# Patient Record
Sex: FEMALE | Race: BLACK OR AFRICAN AMERICAN | Employment: FULL TIME | URBAN - METROPOLITAN AREA
[De-identification: names, ages, dates, MRNs, and addresses within clinical notes are randomized per-mention and may not be internally consistent; named-entity substitution may affect disease eponyms.]

---

## 2017-04-07 ENCOUNTER — APPOINTMENT (OUTPATIENT)
Dept: GENERAL RADIOLOGY | Age: 37
End: 2017-04-07
Attending: EMERGENCY MEDICINE
Payer: COMMERCIAL

## 2017-04-07 ENCOUNTER — HOSPITAL ENCOUNTER (EMERGENCY)
Age: 37
Discharge: HOME | End: 2017-04-08
Attending: EMERGENCY MEDICINE
Payer: COMMERCIAL

## 2017-04-07 DIAGNOSIS — M25.552 PAIN OF LEFT HIP JOINT: Primary | ICD-10-CM

## 2017-04-07 LAB — HCG UR QL: NEGATIVE

## 2017-04-07 PROCEDURE — 73502 X-RAY EXAM HIP UNI 2-3 VIEWS: CPT

## 2017-04-07 PROCEDURE — 96372 THER/PROPH/DIAG INJ SC/IM: CPT

## 2017-04-07 PROCEDURE — 99284 EMERGENCY DEPT VISIT MOD MDM: CPT

## 2017-04-07 PROCEDURE — 81025 URINE PREGNANCY TEST: CPT

## 2017-04-07 RX ORDER — CYCLOBENZAPRINE HCL 10 MG
10 TABLET ORAL
Status: COMPLETED | OUTPATIENT
Start: 2017-04-07 | End: 2017-04-08

## 2017-04-07 RX ORDER — MORPHINE SULFATE 4 MG/ML
6 INJECTION, SOLUTION INTRAMUSCULAR; INTRAVENOUS
Status: COMPLETED | OUTPATIENT
Start: 2017-04-07 | End: 2017-04-08

## 2017-04-07 RX ORDER — ONDANSETRON 4 MG/1
8 TABLET, ORALLY DISINTEGRATING ORAL
Status: COMPLETED | OUTPATIENT
Start: 2017-04-07 | End: 2017-04-08

## 2017-04-08 VITALS
WEIGHT: 245 LBS | TEMPERATURE: 98.3 F | SYSTOLIC BLOOD PRESSURE: 139 MMHG | RESPIRATION RATE: 16 BRPM | OXYGEN SATURATION: 99 % | HEART RATE: 90 BPM | DIASTOLIC BLOOD PRESSURE: 83 MMHG | HEIGHT: 65 IN | BODY MASS INDEX: 40.82 KG/M2

## 2017-04-08 PROCEDURE — 74011250637 HC RX REV CODE- 250/637: Performed by: EMERGENCY MEDICINE

## 2017-04-08 PROCEDURE — 74011250636 HC RX REV CODE- 250/636: Performed by: EMERGENCY MEDICINE

## 2017-04-08 RX ORDER — NAPROXEN 500 MG/1
500 TABLET ORAL 2 TIMES DAILY WITH MEALS
Qty: 20 TAB | Refills: 0 | Status: SHIPPED | OUTPATIENT
Start: 2017-04-08 | End: 2017-04-18

## 2017-04-08 RX ORDER — CYCLOBENZAPRINE HCL 5 MG
5 TABLET ORAL
Qty: 12 TAB | Refills: 0 | Status: SHIPPED | OUTPATIENT
Start: 2017-04-08 | End: 2017-07-06

## 2017-04-08 RX ADMIN — Medication 6 MG: at 00:03

## 2017-04-08 RX ADMIN — CYCLOBENZAPRINE HYDROCHLORIDE 10 MG: 10 TABLET, FILM COATED ORAL at 00:03

## 2017-04-08 RX ADMIN — ONDANSETRON 4 MG: 4 TABLET, ORALLY DISINTEGRATING ORAL at 00:03

## 2017-04-08 NOTE — DISCHARGE INSTRUCTIONS
Hip Pain: Care Instructions  Your Care Instructions  Hip pain may be caused by many things, including overuse, a fall, or a twisting movement. Another cause of hip pain is arthritis. Your pain may increase when you stand up, walk, or squat. The pain may come and go or may be constant. Home treatment can help relieve hip pain, swelling, and stiffness. If your pain is ongoing, you may need more tests and treatment. Follow-up care is a key part of your treatment and safety. Be sure to make and go to all appointments, and call your doctor if you are having problems. Its also a good idea to know your test results and keep a list of the medicines you take. How can you care for yourself at home? · Take pain medicines exactly as directed. ¨ If the doctor gave you a prescription medicine for pain, take it as prescribed. ¨ If you are not taking a prescription pain medicine, ask your doctor if you can take an over-the-counter medicine. · Rest and protect your hip. Take a break from any activity, including standing or walking, that may cause pain. · Put ice or a cold pack against your hip for 10 to 20 minutes at a time. Try to do this every 1 to 2 hours for the next 3 days (when you are awake) or until the swelling goes down. Put a thin cloth between the ice and your skin. · Sleep on your healthy side with a pillow between your knees, or sleep on your back with pillows under your knees. · If there is no swelling, you can put moist heat, a heating pad, or a warm cloth on your hip. Do gentle stretching exercises to help keep your hip flexible. · Learn how to prevent falls. Have your vision and hearing checked regularly. Wear slippers or shoes with a nonskid sole. · Stay at a healthy weight. · Wear comfortable shoes. When should you call for help? Call 911 anytime you think you may need emergency care. For example, call if:  · You have sudden chest pain and shortness of breath, or you cough up blood.   · You are not able to stand or walk or bear weight. · Your buttocks, legs, or feet feel numb or tingly. · Your leg or foot is cool or pale or changes color. · You have severe pain. Call your doctor now or seek immediate medical care if:  · You have signs of infection, such as:  ¨ Increased pain, swelling, warmth, or redness in the hip area. ¨ Red streaks leading from the hip area. ¨ Pus draining from the hip area. ¨ A fever. · You have signs of a blood clot, such as:  ¨ Pain in your calf, back of the knee, thigh, or groin. ¨ Redness and swelling in your leg or groin. · You are not able to bend, straighten, or move your leg normally. · You have trouble urinating or having bowel movements. Watch closely for changes in your health, and be sure to contact your doctor if:  · You do not get better as expected. Where can you learn more? Go to http://dudley-arun.info/. Enter B533 in the search box to learn more about \"Hip Pain: Care Instructions. \"  Current as of: May 27, 2016  Content Version: 11.2  © 6483-3520 [employer]. Care instructions adapted under license by [employer] (which disclaims liability or warranty for this information). If you have questions about a medical condition or this instruction, always ask your healthcare professional. [place] [address] any warranty or liability for your use of this information. We hope that we have addressed all of your medical concerns. The examination and treatment you received in the Emergency Department were for an emergent problem and were not intended as complete care. It is important that you follow up with your healthcare provider(s) for ongoing care. If your symptoms worsen or do not improve as expected, and you are unable to reach your usual health care provider(s), you should return to the Emergency Department.       Today's healthcare is undergoing tremendous change, and patient satisfaction surveys are one of the many tools to assess the quality of medical care. You may receive a survey from the [employer] regarding your experience in the Emergency Department. I hope that your experience has been completely positive, particularly the medical care that I provided. As such, please participate in the survey; anything less than excellent does not meet my expectations or intentions. [facility] and [facility] participate in nationally recognized quality of care measures. If your blood pressure is greater than 120/80, as reported below, we urge that you seek medical care to address the potential of high blood pressure, commonly known as hypertension. Hypertension can be hereditary or can be caused by certain medical conditions, pain, stress, or \"white coat syndrome. \"       Please make an appointment with your health care provider(s) for follow up of your Emergency Department visit. VITALS:   Patient Vitals for the past 8 hrs:   Temp Pulse Resp BP SpO2   04/07/17 2244 97.9 °F (36.6 °C) (!) 105 16 (!) 156/93 100 %          Thank you for allowing us to provide you with medical care today. We realize that you have many choices for your emergency care needs. Please choose us in the future for any continued health care needs. Adella Hoot Jolane Cowden, [address] [place] [address] 20.   Office: [phone]            Recent Results (from the past 24 hour(s))   HCG URINE, QL. - POC    Collection Time: 04/07/17 11:47 PM   Result Value Ref Range    Pregnancy test,urine (POC) NEGATIVE  NEG         No results found.

## 2017-04-08 NOTE — ED TRIAGE NOTES
Patient arrives with c/o left leg pain onset 5 hours. Patient states she felt a \"pop\". She also states she's had this happen before and it's gone away on its own. Patient denies taking anything for pain.

## 2017-04-08 NOTE — ED PROVIDER NOTES
HPI Comments: 29 y.o. female with no significant past medical history who presents from work with chief complaint of hip pain. Pt complains of non-radiating left hip pain and numbness in her left toes that started approximately five hours ago when she stood up from a sitting position at work. Pt states that she felt her left leg \"pop out of place. \" Pt states that she has not been able to move or bear weight on the leg since then. Pt reports that she has experienced similar pain in the past but never at this severity. Pt denies any back pain, urinary symptoms, fever, or chills. Pt denies using any steroids. There are no other acute medical concerns at this time. Social hx: No drug use. Note written by Dillon Brown, as dictated by Quyen David. Kurt Tello MD 11:19 PM          The history is provided by the patient. No  was used. No past medical history on file. No past surgical history on file. No family history on file. Social History     Social History    Marital status: SINGLE     Spouse name: N/A    Number of children: N/A    Years of education: N/A     Occupational History    Not on file. Social History Main Topics    Smoking status: Not on file    Smokeless tobacco: Not on file    Alcohol use Not on file    Drug use: Not on file    Sexual activity: Not on file     Other Topics Concern    Not on file     Social History Narrative         ALLERGIES: Review of patient's allergies indicates no known allergies. Review of Systems   Constitutional: Negative for chills and fever. HENT: Negative for ear pain and sore throat. Eyes: Negative for pain. Respiratory: Negative for chest tightness and shortness of breath. Cardiovascular: Negative for chest pain and leg swelling. Gastrointestinal: Negative for abdominal pain, constipation, diarrhea, nausea and vomiting. Genitourinary: Negative for difficulty urinating, dysuria, flank pain and frequency. Musculoskeletal: Positive for gait problem. Negative for back pain. Hip pain   Skin: Negative for rash. Neurological: Positive for numbness. Negative for headaches. All other systems reviewed and are negative. Vitals:    04/07/17 2244   BP: (!) 156/93   Pulse: (!) 105   Resp: 16   Temp: 97.9 °F (36.6 °C)   SpO2: 100%   Weight: 111.1 kg (245 lb)   Height: 5' 5\" (1.651 m)            Physical Exam   Constitutional: She appears well-developed and well-nourished. HENT:   Head: Normocephalic and atraumatic. Mouth/Throat: Oropharynx is clear and moist.   Eyes: Conjunctivae and EOM are normal. Pupils are equal, round, and reactive to light. No scleral icterus. Neck: Neck supple. No tracheal deviation present. Cardiovascular: Normal rate, regular rhythm and normal heart sounds. Pulmonary/Chest: Effort normal and breath sounds normal. No respiratory distress. Abdominal: Soft. She exhibits no distension. There is no tenderness. There is no rebound and no guarding. Genitourinary:   Genitourinary Comments: deferred   Musculoskeletal: She exhibits no edema. Pain with internal and external rotation of left hip. Tenderness of anterior hip. Motor and sensation normal in lower extremities. Neurological: She is alert. Skin: Skin is warm and dry. Psychiatric: She has a normal mood and affect. Nursing note and vitals reviewed. Note written by Dillon Jaimes, as dictated by Adam Christine. Zunilda Cary MD 11:19 PM        MDM  Number of Diagnoses or Management Options  Pain of left hip joint:   Diagnosis management comments: 29year old obese female presents with hip pain that started suddenly today. Differential diagnosis: muscle strain, muscle spasm, avascular necrosis  Plan to obtain hip x-ray.  Treat with morphine and flexeril    Hip x-ray unremarkable per my interpretation     A/P: hip pain- follow up with Ortho PRN, naprosyn, flexeril       Amount and/or Complexity of Data Reviewed  Clinical lab tests: ordered and reviewed  Tests in the radiology section of CPT®: ordered and reviewed  Independent visualization of images, tracings, or specimens: yes    Patient Progress  Patient progress: stable    ED Course       Procedures

## 2017-07-06 ENCOUNTER — APPOINTMENT (OUTPATIENT)
Dept: GENERAL RADIOLOGY | Age: 37
DRG: 812 | End: 2017-07-06
Attending: EMERGENCY MEDICINE
Payer: COMMERCIAL

## 2017-07-06 ENCOUNTER — APPOINTMENT (OUTPATIENT)
Dept: CT IMAGING | Age: 37
DRG: 812 | End: 2017-07-06
Attending: EMERGENCY MEDICINE
Payer: COMMERCIAL

## 2017-07-06 ENCOUNTER — HOSPITAL ENCOUNTER (INPATIENT)
Age: 37
LOS: 3 days | Discharge: HOME | DRG: 812 | End: 2017-07-09
Attending: EMERGENCY MEDICINE | Admitting: FAMILY MEDICINE
Payer: COMMERCIAL

## 2017-07-06 DIAGNOSIS — D64.9 ANEMIA, UNSPECIFIED TYPE: Primary | ICD-10-CM

## 2017-07-06 PROBLEM — R10.84 GENERALIZED ABDOMINAL PAIN: Status: ACTIVE | Noted: 2017-07-06

## 2017-07-06 LAB
ALBUMIN SERPL BCP-MCNC: 3.5 G/DL (ref 3.5–5)
ALBUMIN/GLOB SERPL: 1 {RATIO} (ref 1.1–2.2)
ALP SERPL-CCNC: 73 U/L (ref 45–117)
ALT SERPL-CCNC: 25 U/L (ref 12–78)
ANION GAP BLD CALC-SCNC: 7 MMOL/L (ref 5–15)
APPEARANCE UR: ABNORMAL
AST SERPL W P-5'-P-CCNC: 13 U/L (ref 15–37)
BACTERIA URNS QL MICRO: NEGATIVE /HPF
BILIRUB SERPL-MCNC: 1.6 MG/DL (ref 0.2–1)
BILIRUB UR QL: NEGATIVE
BUN SERPL-MCNC: 5 MG/DL (ref 6–20)
BUN/CREAT SERPL: 6 (ref 12–20)
CALCIUM SERPL-MCNC: 8.8 MG/DL (ref 8.5–10.1)
CHLORIDE SERPL-SCNC: 107 MMOL/L (ref 97–108)
CO2 SERPL-SCNC: 25 MMOL/L (ref 21–32)
COLOR UR: ABNORMAL
CREAT SERPL-MCNC: 0.77 MG/DL (ref 0.55–1.02)
EPITH CASTS URNS QL MICRO: ABNORMAL /LPF
GLOBULIN SER CALC-MCNC: 3.6 G/DL (ref 2–4)
GLUCOSE SERPL-MCNC: 91 MG/DL (ref 65–100)
GLUCOSE UR STRIP.AUTO-MCNC: NEGATIVE MG/DL
HCG UR QL: NEGATIVE
HEMOCCULT STL QL: NEGATIVE
HGB UR QL STRIP: NEGATIVE
IRON SATN MFR SERPL: 4 % (ref 20–50)
IRON SERPL-MCNC: 21 UG/DL (ref 35–150)
KETONES UR QL STRIP.AUTO: NEGATIVE MG/DL
LEUKOCYTE ESTERASE UR QL STRIP.AUTO: NEGATIVE
NITRITE UR QL STRIP.AUTO: NEGATIVE
PH UR STRIP: 7 [PH] (ref 5–8)
POTASSIUM SERPL-SCNC: 3.9 MMOL/L (ref 3.5–5.1)
PROT SERPL-MCNC: 7.1 G/DL (ref 6.4–8.2)
PROT UR STRIP-MCNC: 300 MG/DL
RBC #/AREA URNS HPF: ABNORMAL /HPF (ref 0–5)
SODIUM SERPL-SCNC: 139 MMOL/L (ref 136–145)
SP GR UR REFRACTOMETRY: 1.01 (ref 1–1.03)
TIBC SERPL-MCNC: 467 UG/DL (ref 250–450)
UA: UC IF INDICATED,UAUC: ABNORMAL
UROBILINOGEN UR QL STRIP.AUTO: 1 EU/DL (ref 0.2–1)
WBC URNS QL MICRO: ABNORMAL /HPF (ref 0–4)

## 2017-07-06 PROCEDURE — 93971 EXTREMITY STUDY: CPT

## 2017-07-06 PROCEDURE — 83540 ASSAY OF IRON: CPT | Performed by: FAMILY MEDICINE

## 2017-07-06 PROCEDURE — 81025 URINE PREGNANCY TEST: CPT

## 2017-07-06 PROCEDURE — 99285 EMERGENCY DEPT VISIT HI MDM: CPT

## 2017-07-06 PROCEDURE — 74011000258 HC RX REV CODE- 258: Performed by: EMERGENCY MEDICINE

## 2017-07-06 PROCEDURE — 30233N1 TRANSFUSION OF NONAUTOLOGOUS RED BLOOD CELLS INTO PERIPHERAL VEIN, PERCUTANEOUS APPROACH: ICD-10-PCS | Performed by: FAMILY MEDICINE

## 2017-07-06 PROCEDURE — 85025 COMPLETE CBC W/AUTO DIFF WBC: CPT | Performed by: EMERGENCY MEDICINE

## 2017-07-06 PROCEDURE — 82607 VITAMIN B-12: CPT | Performed by: FAMILY MEDICINE

## 2017-07-06 PROCEDURE — 80053 COMPREHEN METABOLIC PANEL: CPT | Performed by: EMERGENCY MEDICINE

## 2017-07-06 PROCEDURE — 36430 TRANSFUSION BLD/BLD COMPNT: CPT

## 2017-07-06 PROCEDURE — 81001 URINALYSIS AUTO W/SCOPE: CPT | Performed by: EMERGENCY MEDICINE

## 2017-07-06 PROCEDURE — 36415 COLL VENOUS BLD VENIPUNCTURE: CPT | Performed by: EMERGENCY MEDICINE

## 2017-07-06 PROCEDURE — 86900 BLOOD TYPING SEROLOGIC ABO: CPT | Performed by: EMERGENCY MEDICINE

## 2017-07-06 PROCEDURE — 82272 OCCULT BLD FECES 1-3 TESTS: CPT | Performed by: EMERGENCY MEDICINE

## 2017-07-06 PROCEDURE — 74011636320 HC RX REV CODE- 636/320: Performed by: EMERGENCY MEDICINE

## 2017-07-06 PROCEDURE — 86923 COMPATIBILITY TEST ELECTRIC: CPT | Performed by: EMERGENCY MEDICINE

## 2017-07-06 PROCEDURE — 74177 CT ABD & PELVIS W/CONTRAST: CPT

## 2017-07-06 PROCEDURE — 71020 XR CHEST PA LAT: CPT

## 2017-07-06 PROCEDURE — 65270000032 HC RM SEMIPRIVATE

## 2017-07-06 PROCEDURE — P9016 RBC LEUKOCYTES REDUCED: HCPCS | Performed by: EMERGENCY MEDICINE

## 2017-07-06 RX ORDER — SODIUM CHLORIDE 9 MG/ML
250 INJECTION, SOLUTION INTRAVENOUS AS NEEDED
Status: DISCONTINUED | OUTPATIENT
Start: 2017-07-06 | End: 2017-07-07 | Stop reason: ALTCHOICE

## 2017-07-06 RX ORDER — SODIUM CHLORIDE 0.9 % (FLUSH) 0.9 %
5-10 SYRINGE (ML) INJECTION AS NEEDED
Status: DISCONTINUED | OUTPATIENT
Start: 2017-07-06 | End: 2017-07-09 | Stop reason: HOSPADM

## 2017-07-06 RX ORDER — SODIUM CHLORIDE 0.9 % (FLUSH) 0.9 %
10 SYRINGE (ML) INJECTION
Status: COMPLETED | OUTPATIENT
Start: 2017-07-06 | End: 2017-07-06

## 2017-07-06 RX ORDER — SODIUM CHLORIDE 0.9 % (FLUSH) 0.9 %
5-10 SYRINGE (ML) INJECTION EVERY 8 HOURS
Status: DISCONTINUED | OUTPATIENT
Start: 2017-07-06 | End: 2017-07-09 | Stop reason: HOSPADM

## 2017-07-06 RX ADMIN — Medication 10 ML: at 17:54

## 2017-07-06 RX ADMIN — IOPAMIDOL 100 ML: 755 INJECTION, SOLUTION INTRAVENOUS at 17:54

## 2017-07-06 RX ADMIN — SODIUM CHLORIDE 100 ML: 900 INJECTION, SOLUTION INTRAVENOUS at 17:54

## 2017-07-06 RX ADMIN — Medication 10 ML: at 23:00

## 2017-07-06 NOTE — PROCEDURES
[facility]  *** FINAL REPORT ***    Name: Chase Choudhury  MRN: [id number]  : 16 May 1980  HIS Order #: [id number]  [id number] [facility] Visit #: [id number]  Date: 2017    TYPE OF TEST: Peripheral Venous Testing    REASON FOR TEST  Pain in limb, Limb swelling    Left Leg:-  Deep venous thrombosis:           No  Superficial venous thrombosis:    No  Deep venous insufficiency:        Not examined  Superficial venous insufficiency: Not examined      INTERPRETATION/FINDINGS  PROCEDURE:  Color duplex ultrasound imaging of lower extremity veins. FINDINGS:       Right: The common femoral vein is patent and without evidence of  thrombus. Phasic flow is observed. This extremity was not otherwise  evaluated. Left:   The common femoral, deep femoral, femoral, popliteal,  posterior tibial, peroneal, and great saphenous are patent and without   evidence of thrombus;  each is fully compressible and there is no  narrowing of the flow channel on color Doppler imaging. Phasic flow  is observed in the common femoral vein. IMPRESSION:  No evidence of left lower extremity vein thrombosis. ADDITIONAL COMMENTS    I have personally reviewed the data relevant to the interpretation of  this  study.     TECHNOLOGIST: SAMI Franklin, RCS  Signed: 2017 06:22 PM    PHYSICIAN: Pam Guerra MD  Signed: 2017 08:32 PM

## 2017-07-06 NOTE — ED TRIAGE NOTES
Triage: [facility] from [facility] for abnormal lab results. [facility] to [facility] for uterine fibroids/abd pain. +N/V. Left leg pain/swelling.  Hgb 4.4 and platelets 45E. +bacteria in urine

## 2017-07-06 NOTE — ED NOTES
Attempt at IV unsuccessful, blood obtained for lab work however. Other ER staff attempting IV insertion.

## 2017-07-06 NOTE — IP AVS SNAPSHOT
[address] 
[phone] Patient: Kory Norwood MRN: [id number] You are allergic to the following No active allergies Recent Documentation Height Weight BMI OB Status Smoking Status 1.651 m 113.4 kg 41.6 kg/m2 Unknown Former Smoker Emergency Contacts  (Rel.) Home Phone Work Phone Mobile Phone Alphonse Median (Sister) [phone] -- --  
 Self,Self -- -- -- About your hospitalization You were admitted on:  2017 You last received care in the:  [facility] You were discharged on:  2017 Why you were hospitalized Your primary diagnosis was:  Symptomatic Anemia Your diagnoses also included:  Generalized Abdominal Pain, Fibroids, Morbid Obesity (Hcc), Bilateral Leg Edema, Hypokalemia, Hypertension Providers Seen During Your Hospitalizations Provider Role Specialty Primary office phone Luis Willams. Patricia Cardoza MD Attending Provider Emergency Medicine [phone] Katarzyna Hoffmann MD Attending Provider [facility] [phone] Ayanna Michael MD Attending Provider Internal Medicine [phone] Your Primary Care Physician (PCP) Primary Care Physician Office Phone Office Fax NONE ** None ** ** None ** Follow-up Information Follow up With Details Comments Contact Info [facility] [address] On 2017 31:95 am w/ Dr. Kya Rhodes: New Patient Appointment (Please arrive at 10:15 am to complete documentation) [place] [address] [facility] [address] 
[phone] Osei Barfield MD  new PCP as scheduled [address] [facility] [address] [facility] [address] 
[phone] [facility]  Please schedule for your new PCP. [place] [address] 
[phone] Current Discharge Medication List  
  
START taking these medications Dose & Instructions Dispensing Information Comments Morning Noon Evening Bedtime Blood Pressure Test Kit-Large Kit Your last dose was: Your next dose is:    
   
   
 Dose:  1 Each  
1 Each by Does Not Apply route daily. Indications: hypertension Quantity:  1 Kit Refills:  0  
     
   
   
   
  
 lisinopril 40 mg tablet Commonly known as:  Valene Pencil Your last dose was: Your next dose is:    
   
   
 Dose:  40 mg Take 1 Tab by mouth daily. Indications: hypertension Quantity:  30 Tab Refills:  0 Where to Get Your Medications Information on where to get these meds will be given to you by the nurse or doctor. ! Ask your nurse or doctor about these medications Blood Pressure Test Kit-Large Kit  
 lisinopril 40 mg tablet Discharge Instructions ADDITIONAL CARE RECOMMENDATIONS:  
1. Take medications as prescribed. 2. Keep appointment(s) as recommended/scheduled. 3. You have been started on lisinopril for high blood pressure. Please note your blood pressures and show to your doctor. If you desire pregnancy, you may need to be started on a different blood pressure medication. 4. Continue to stay off cigarettes. 5. Try to lose weight. DIET: Cardiac Diet ACTIVITY: Activity as tolerated WOUND CARE: none EQUIPMENT needed: none Learning About High Blood Pressure What is high blood pressure? Blood pressure is a measure of how hard the blood pushes against the walls of your arteries. It's normal for blood pressure to go up and down throughout the day, but if it stays up, you have high blood pressure. Another name for high blood pressure is hypertension. Two numbers tell you your blood pressure. The first number is the systolic pressure. It shows how hard the blood pushes when your heart is pumping. The second number is the diastolic pressure. It shows how hard the blood pushes between heartbeats, when your heart is relaxed and filling with blood. A blood pressure of less than 120/80 (say \"120 over 80\") is ideal for an adult. High blood pressure is 140/90 or higher. You have high blood pressure if your top number is 140 or higher or your bottom number is 90 or higher, or both. Many people fall into the category in between, called prehypertension. People with prehypertension need to make lifestyle changes to bring their blood pressure down and help prevent or delay high blood pressure. What happens when you have high blood pressure? · Blood flows through your arteries with too much force. Over time, this damages the walls of your arteries. But you can't feel it. High blood pressure usually doesn't cause symptoms. · Fat and calcium start to build up in your arteries. This buildup is called plaque. Plaque makes your arteries narrower and stiffer. Blood can't flow through them as easily. · This lack of good blood flow starts to damage some of the organs in your body. This can lead to problems such as coronary artery disease and heart attack, heart failure, stroke, kidney failure, and eye damage. How can you prevent high blood pressure? · Stay at a healthy weight. · Try to limit how much sodium you eat to less than 2,300 milligrams (mg) a day. If you limit your sodium to 1,500 mg a day, you can lower your blood pressure even more. ¨ Buy foods that are labeled \"unsalted,\" \"sodium-free,\" or \"low-sodium. \" Foods labeled \"reduced-sodium\" and \"light sodium\" may still have too much sodium. ¨ Flavor your food with garlic, lemon juice, onion, vinegar, herbs, and spices instead of salt. Do not use soy sauce, steak sauce, onion salt, garlic salt, mustard, or ketchup on your food. ¨ Use less salt (or none) when recipes call for it. You can often use half the salt a recipe calls for without losing flavor. · Be physically active. Get at least 30 minutes of exercise on most days of the week. Walking is a good choice. You also may want to do other activities, such as running, swimming, cycling, or playing tennis or team sports. · Limit alcohol to 2 drinks a day for men and 1 drink a day for women. · Eat plenty of fruits, vegetables, and low-fat dairy products. Eat less saturated and total fats. How is high blood pressure treated? · Your doctor will suggest making lifestyle changes. For example, your doctor may ask you to eat healthy foods, quit smoking, lose extra weight, and be more active. · If lifestyle changes don't help enough or your blood pressure is very high, you will have to take medicine every day. Follow-up care is a key part of your treatment and safety. Be sure to make and go to all appointments, and call your doctor if you are having problems. It's also a good idea to know your test results and keep a list of the medicines you take. Where can you learn more? Go to http://dudley-arun.info/. Enter P501 in the search box to learn more about \"Learning About High Blood Pressure. \" Current as of: March 23, 2016 Content Version: 11.3 © 3842-6947 Vacation Your Way. Care instructions adapted under license by [employer] (which disclaims liability or warranty for this information). If you have questions about a medical condition or this instruction, always ask your healthcare professional. [address] any warranty or liability for your use of this information. Uterine Fibroids: Care Instructions Your Care Instructions Uterine fibroids are growths in the uterus. Fibroids aren't cancer. Doctors don't know what causes fibroids. Fibroids are very common in women during their childbearing years.  
Fibroids can grow on the inside of the uterus, in the muscle wall of the uterus, or near the outside wall of the uterus. In some women, fibroids cause painful cramps and heavy periods. In these cases, taking anti-inflammatory medicines, birth control pills, or using an intrauterine device (IUD) often helps decrease symptoms. Sometimes surgery is needed to treat fibroids. But if you are near menopause, you may want to wait and see if your symptoms get better. Most fibroids shrink and go away after menopause, when your menstrual periods stop completely. Follow-up care is a key part of your treatment and safety. Be sure to make and go to all appointments, and call your doctor if you are having problems. It's also a good idea to know your test results and keep a list of the medicines you take. How can you care for yourself at home? · If your doctor gave you medicine, take it as exactly as prescribed. Call your doctor if you think you are having a problem with your medicine. · Take anti-inflammatory medicines for pain. These include ibuprofen (Advil, Motrin) and naproxen (Aleve). Read and follow all instructions on the label. · Use heat, such as a hot water bottle or a heating pad set on low, or a warm bath to relax tense muscles and relieve cramping. Put a thin cloth between the heating pad and your skin. Never go to sleep with a heating pad on. · Lie down and put a pillow under your knees. Or, lie on your side and bring your knees up to your chest. These positions may help relieve belly pain or pressure. · Keep track of how many sanitary pads or tampons you use each day. · Get at least 30 minutes of exercise on most days of the week. Walking is a good choice. You also may want to do other activities, such as running, swimming, cycling, or playing tennis or team sports. · If you bleed longer than usual or have heavy bleeding, take a daily multivitamin with iron. When should you call for help? Call 911 anytime you think you may need emergency care. For example, call if: · You passed out (lost consciousness). · You have sudden, severe pain in your belly or pelvis. Call your doctor now or seek immediate medical care if: 
· You have severe vaginal bleeding. This means that you are soaking through your usual pads each hour for 2 or more hours. · You have new belly or pelvic pain. · You are dizzy or lightheaded, or you feel like you may faint. · You have new or unexpected vaginal bleeding. Watch closely for changes in your health, and be sure to contact your doctor if: 
· You have new vaginal discharge. · You have ongoing heavy or irregular vaginal bleeding. · You have pelvic pain or a heavy feeling in your lower belly that doesn't go away. · You have to urinate often. · You are more constipated than usual. 
Where can you learn more? Go to http://dudley-arun.info/. Enter B121 in the search box to learn more about \"Uterine Fibroids: Care Instructions. \" Current as of: October 13, 2016 Content Version: 11.3 © 2482-4196 [employer]. Care instructions adapted under license by [employer] (which disclaims liability or warranty for this information). If you have questions about a medical condition or this instruction, always ask your healthcare professional. [place] [address] any warranty or liability for your use of this information. Discharge Instructions Attachments/References LISINOPRIL (BY MOUTH) (ENGLISH) Discharge Orders None [employer] Announcement We are excited to announce that we are making your provider's discharge notes available to you in Besstech. You will see these notes when they are completed and signed by the physician that discharged you from your recent hospital stay.   If you have any questions or concerns about any information you see in Besstech, please call the Health Information Department where you were seen or reach out to your Primary Care Provider for more information about your plan of care. Introducing [facility]! Nuria Casillas introduces [employer] patient portal. Now you can access parts of your medical record, email your doctor's office, and request medication refills online. 1. In your internet browser, go to [email or web address] 2. Click on the First Time User? Click Here link in the Sign In box. You will see the New Member Sign Up page. 3. Enter your [employer] Access Code exactly as it appears below. You will not need to use this code after youve completed the sign-up process. If you do not sign up before the expiration date, you must request a new code. · [employer] Access Code: [email or web address] Expires: 10/7/2017  1:18 PM 
 
4. Enter the last four digits of your Social Security Number (xxxx) and Date of Birth (mm/dd/yyyy) as indicated and click Submit. You will be taken to the next sign-up page. 5. Create a [employer]ience ID. This will be your [employer] login ID and cannot be changed, so think of one that is secure and easy to remember. 6. Create a [employer] password. You can change your password at any time. 7. Enter your Password Reset Question and Answer. This can be used at a later time if you forget your password. 8. Enter your e-mail address. You will receive e-mail notification when new information is available in [address] 9. Click Sign Up. You can now view and download portions of your medical record. 10. Click the Download Summary menu link to download a portable copy of your medical information. If you have questions, please visit the Frequently Asked Questions section of the [employer] website. Remember, [employer]ience is NOT to be used for urgent needs. For medical emergencies, dial 911. Now available from your iPhone and Android! General Information Please provide this summary of care documentation to your next provider.  
  
  
    
    
 Patient Signature: ____________________________________________________________ Date:  ____________________________________________________________  
  
Fayrene Retort Provider Signature:  ____________________________________________________________ Date:  ____________________________________________________________

## 2017-07-06 NOTE — ED NOTES
Pt. ambulated to bathroom and returned without change or incident. This is 2nd ambulation without issues.

## 2017-07-06 NOTE — ED PROVIDER NOTES
HPI Comments: 40 y.o. female with past medical history significant for uterine fibroid, HTN, fibroids and anemia who presents from home with chief complaint of abdominal pain. Per pt, she experienced moderate pain today (7/6/2017) to her mid abd, above her umbilicus with associated \"knot to my stomach\". She notes that this knot like mass upon palpation to her abd is located near the site of where her fibroids are located. The pt adds that her pain is associated to moderate abd distention which she states she has not experienced in the past. She rates her current pain 7/10. In addition to her abd pain and distention the pt notes that she has experienced vomiting (phlegm), SOB with ambulating, as well as fatigue and lightheadedness. Upon being evaluated today at [facility], she was found to have a hemoglobin of 4.4. The pt notes she was referred to the ED at that time for further evaluation. While in the ED, the pt notes that her lowest known hemoglobin in the past was recorded as 5.5. Per pt, she has past hx of anemia which required iron infusions however she has no hx of blood transfusions. The pt makes it known that she has not experienced a MP in the past four months and that she has only seen mild spotting. She states she is not currently on any hormone or birth control medication and has no current chance of pregnancy. Per pt, in addition to her new onset symptoms today, she also notes that she has been experiencing ongoing left LE swelling over the past month. She denies having any recent operatins or hospitalizations. The pt further denies fever, chills, Nausea, diarrhea, dark or tarry stool, dizziness, headache and urinary symptoms. There are no other acute medical concerns at this time. Social hx: Former smoker, No ETOH use  PCP: None    Note written by Dillon Sinha, as dictated by Wang Read. Renae Scanlon MD 5:30 PM          The history is provided by the patient.         Past Medical History: Diagnosis Date    Anemia     Fibroids     Hypertension     Uterine fibroid        Past Surgical History:   Procedure Laterality Date    HX MYOMECTOMY      HX MYOMECTOMY      INSERT MESH/PELVIC FLR ADDON           History reviewed. No pertinent family history. Social History     Social History    Marital status: SINGLE     Spouse name: N/A    Number of children: N/A    Years of education: N/A     Occupational History    Not on file. Social History Main Topics    Smoking status: Former Smoker     Quit date: 4/6/2017    Smokeless tobacco: Not on file    Alcohol use No      Comment: \"not really\"    Drug use: Yes     Special: Marijuana    Sexual activity: No     Other Topics Concern    Not on file     Social History Narrative         ALLERGIES: Review of patient's allergies indicates no known allergies. Review of Systems   Constitutional: Positive for fatigue. Negative for chills and fever. HENT: Negative for ear pain and sore throat. Eyes: Negative. Negative for pain. Respiratory: Positive for shortness of breath (with ambulation today (7/6/2017)). Negative for chest tightness. Cardiovascular: Positive for leg swelling (ongoing x1 month with significance to LLE). Negative for chest pain. Gastrointestinal: Positive for abdominal distention (associated with abdominal pain), abdominal pain (moderate pain to periumbilical region with apparent \"knot\" ) and vomiting (onset today with phelgm ). Negative for blood in stool, constipation and nausea. Endocrine: Negative. Genitourinary: Negative. Negative for dysuria, flank pain, frequency, urgency and vaginal bleeding. Musculoskeletal: Negative. Negative for back pain. Skin: Negative. Negative for rash. Allergic/Immunologic: Negative. Neurological: Positive for light-headedness. Negative for dizziness, weakness and headaches. All other systems reviewed and are negative.       Vitals:    07/06/17 1628 07/06/17 1702   BP: Artis Beatty ) 172/106 138/85   Pulse: 83 80   Resp: 18 18   Temp: 97.2 °F (36.2 °C)    SpO2: 100% 100%   Weight: 113.4 kg (250 lb)    Height: 5' 5\" (1.651 m)             Physical Exam   Constitutional: She appears well-developed and well-nourished. HENT:   Head: Normocephalic and atraumatic. Mouth/Throat: Oropharynx is clear and moist.   Eyes: EOM are normal. Pupils are equal, round, and reactive to light. No scleral icterus. Pale conjunctiva   Neck: Neck supple. No tracheal deviation present. Cardiovascular: Regular rhythm, normal heart sounds and intact distal pulses. Tachycardic    Pulmonary/Chest: Effort normal and breath sounds normal. No respiratory distress. Abdominal: Soft. She exhibits no distension. There is tenderness (to periumbilical region). There is no rebound and no guarding. Genitourinary:   Genitourinary Comments: deferred   Musculoskeletal: She exhibits edema (2+ to right with 4+ to left). Neurological: She is alert. Skin: Skin is warm and dry. Psychiatric: She has a normal mood and affect. Nursing note and vitals reviewed. Note written by Dillon Whitney, as dictated by Saba Henry. Cesar Jerome MD 4:49 PM    MDM  Number of Diagnoses or Management Options  Anemia, unspecified type:   Diagnosis management comments: 70-year-old female presents with anemia from urgent care. Hemoglobin returned 4. She was transfused 2 units in the emergency department. Her CT of the abdomen showed an enlarged uterus. I spoke with the [facility] GYN hospitalist who stated that there was nothing to do during the admission given that she is no longer bleeding. A/P: anemia- transfuse, admit, ob consult    Critical Care  Total time providing critical care: (Total critical care time spent exclusive of procedures: 33 minutes  )    ED Course       Procedures    CONSULT NOTE:  7:52 PM Edvin Jerome MD spoke with, Consult for [facility].  Discussed available diagnostic tests and clinical findings. They are in agreement with care plans as outlined. OBGYN hospitalist states they will come see the pt when able.

## 2017-07-07 LAB
ANION GAP BLD CALC-SCNC: 8 MMOL/L (ref 5–15)
BASOPHILS # BLD AUTO: 0.1 K/UL (ref 0–0.1)
BASOPHILS # BLD AUTO: 0.2 K/UL (ref 0–0.1)
BASOPHILS # BLD: 2 % (ref 0–1)
BASOPHILS # BLD: 2 % (ref 0–1)
BUN SERPL-MCNC: 5 MG/DL (ref 6–20)
BUN/CREAT SERPL: 7 (ref 12–20)
CALCIUM SERPL-MCNC: 8.5 MG/DL (ref 8.5–10.1)
CHLORIDE SERPL-SCNC: 105 MMOL/L (ref 97–108)
CO2 SERPL-SCNC: 25 MMOL/L (ref 21–32)
CREAT SERPL-MCNC: 0.72 MG/DL (ref 0.55–1.02)
DIFFERENTIAL METHOD BLD: ABNORMAL
DIFFERENTIAL METHOD BLD: ABNORMAL
EOSINOPHIL # BLD: 0 K/UL (ref 0–0.4)
EOSINOPHIL # BLD: 0.5 K/UL (ref 0–0.4)
EOSINOPHIL NFR BLD: 0 % (ref 0–7)
EOSINOPHIL NFR BLD: 8 % (ref 0–7)
ERYTHROCYTE [DISTWIDTH] IN BLOOD BY AUTOMATED COUNT: 30.1 % (ref 11.5–14.5)
ERYTHROCYTE [DISTWIDTH] IN BLOOD BY AUTOMATED COUNT: 32.3 % (ref 11.5–14.5)
GLUCOSE SERPL-MCNC: 90 MG/DL (ref 65–100)
HCT VFR BLD AUTO: 16.4 % (ref 35–47)
HCT VFR BLD AUTO: 20.5 % (ref 35–47)
HGB BLD-MCNC: 4.2 G/DL (ref 11.5–16)
HGB BLD-MCNC: 5.8 G/DL (ref 11.5–16)
LYMPHOCYTES # BLD AUTO: 25 % (ref 12–49)
LYMPHOCYTES # BLD AUTO: 28 % (ref 12–49)
LYMPHOCYTES # BLD: 1.7 K/UL (ref 0.8–3.5)
LYMPHOCYTES # BLD: 2.4 K/UL (ref 0.8–3.5)
MCH RBC QN AUTO: 15.9 PG (ref 26–34)
MCH RBC QN AUTO: 18.9 PG (ref 26–34)
MCHC RBC AUTO-ENTMCNC: 25.6 G/DL (ref 30–36.5)
MCHC RBC AUTO-ENTMCNC: 28.3 G/DL (ref 30–36.5)
MCV RBC AUTO: 62.1 FL (ref 80–99)
MCV RBC AUTO: 66.8 FL (ref 80–99)
METAMYELOCYTES NFR BLD MANUAL: 3 %
MONOCYTES # BLD: 0.3 K/UL (ref 0–1)
MONOCYTES # BLD: 0.5 K/UL (ref 0–1)
MONOCYTES NFR BLD AUTO: 4 % (ref 5–13)
MONOCYTES NFR BLD AUTO: 7 % (ref 5–13)
NEUTS SEG # BLD: 4 K/UL (ref 1.8–8)
NEUTS SEG # BLD: 5.4 K/UL (ref 1.8–8)
NEUTS SEG NFR BLD AUTO: 58 % (ref 32–75)
NEUTS SEG NFR BLD AUTO: 63 % (ref 32–75)
NRBC # BLD: 0.1 K/UL (ref 0–0.01)
NRBC BLD-RTO: 1.2 PER 100 WBC
PATH REV BLD -IMP: ABNORMAL
PLATELET # BLD AUTO: 133 K/UL (ref 150–400)
PLATELET # BLD AUTO: 97 K/UL (ref 150–400)
PLATELET COMMENTS,PCOM: ABNORMAL
PLATELET COMMENTS,PCOM: ABNORMAL
POTASSIUM SERPL-SCNC: 3.8 MMOL/L (ref 3.5–5.1)
RBC # BLD AUTO: 2.64 M/UL (ref 3.8–5.2)
RBC # BLD AUTO: 3.07 M/UL (ref 3.8–5.2)
RBC MORPH BLD: ABNORMAL
SODIUM SERPL-SCNC: 138 MMOL/L (ref 136–145)
VIT B12 SERPL-MCNC: 921 PG/ML (ref 211–911)
WBC # BLD AUTO: 6.8 K/UL (ref 3.6–11)
WBC # BLD AUTO: 8.5 K/UL (ref 3.6–11)
WBC NRBC COR # BLD: ABNORMAL 10*3/UL

## 2017-07-07 PROCEDURE — 74011250636 HC RX REV CODE- 250/636: Performed by: INTERNAL MEDICINE

## 2017-07-07 PROCEDURE — 74011250637 HC RX REV CODE- 250/637: Performed by: INTERNAL MEDICINE

## 2017-07-07 PROCEDURE — 85025 COMPLETE CBC W/AUTO DIFF WBC: CPT | Performed by: FAMILY MEDICINE

## 2017-07-07 PROCEDURE — 80048 BASIC METABOLIC PNL TOTAL CA: CPT | Performed by: FAMILY MEDICINE

## 2017-07-07 PROCEDURE — 65270000032 HC RM SEMIPRIVATE

## 2017-07-07 PROCEDURE — 77030029684 HC NEB SM VOL KT MONA -A

## 2017-07-07 PROCEDURE — 36430 TRANSFUSION BLD/BLD COMPNT: CPT

## 2017-07-07 PROCEDURE — 36415 COLL VENOUS BLD VENIPUNCTURE: CPT | Performed by: FAMILY MEDICINE

## 2017-07-07 PROCEDURE — P9016 RBC LEUKOCYTES REDUCED: HCPCS | Performed by: EMERGENCY MEDICINE

## 2017-07-07 PROCEDURE — 77030029131 HC ADMN ST IV BLD N DEHP ICUM -B

## 2017-07-07 PROCEDURE — 51798 US URINE CAPACITY MEASURE: CPT

## 2017-07-07 RX ORDER — SODIUM CHLORIDE 9 MG/ML
250 INJECTION, SOLUTION INTRAVENOUS AS NEEDED
Status: DISCONTINUED | OUTPATIENT
Start: 2017-07-07 | End: 2017-07-09 | Stop reason: HOSPADM

## 2017-07-07 RX ORDER — ACETAMINOPHEN 325 MG/1
650 TABLET ORAL
Status: DISCONTINUED | OUTPATIENT
Start: 2017-07-07 | End: 2017-07-09 | Stop reason: HOSPADM

## 2017-07-07 RX ORDER — FUROSEMIDE 10 MG/ML
10 INJECTION INTRAMUSCULAR; INTRAVENOUS ONCE
Status: COMPLETED | OUTPATIENT
Start: 2017-07-07 | End: 2017-07-07

## 2017-07-07 RX ORDER — FUROSEMIDE 10 MG/ML
20 INJECTION INTRAMUSCULAR; INTRAVENOUS ONCE
Status: COMPLETED | OUTPATIENT
Start: 2017-07-07 | End: 2017-07-07

## 2017-07-07 RX ORDER — HYDRALAZINE HYDROCHLORIDE 20 MG/ML
10 INJECTION INTRAMUSCULAR; INTRAVENOUS ONCE
Status: COMPLETED | OUTPATIENT
Start: 2017-07-07 | End: 2017-07-07

## 2017-07-07 RX ORDER — CYCLOBENZAPRINE HCL 10 MG
10 TABLET ORAL
Status: DISCONTINUED | OUTPATIENT
Start: 2017-07-07 | End: 2017-07-09 | Stop reason: HOSPADM

## 2017-07-07 RX ADMIN — Medication 10 ML: at 14:23

## 2017-07-07 RX ADMIN — ACETAMINOPHEN 650 MG: 325 TABLET, FILM COATED ORAL at 16:49

## 2017-07-07 RX ADMIN — FUROSEMIDE 20 MG: 10 INJECTION, SOLUTION INTRAMUSCULAR; INTRAVENOUS at 22:24

## 2017-07-07 RX ADMIN — FUROSEMIDE 10 MG: 10 INJECTION, SOLUTION INTRAMUSCULAR; INTRAVENOUS at 15:25

## 2017-07-07 RX ADMIN — FUROSEMIDE 10 MG: 10 INJECTION, SOLUTION INTRAMUSCULAR; INTRAVENOUS at 17:53

## 2017-07-07 RX ADMIN — HYDRALAZINE HYDROCHLORIDE 10 MG: 20 INJECTION INTRAMUSCULAR; INTRAVENOUS at 11:42

## 2017-07-07 RX ADMIN — Medication 10 ML: at 21:59

## 2017-07-07 RX ADMIN — Medication 10 ML: at 11:43

## 2017-07-07 RX ADMIN — Medication 10 ML: at 06:40

## 2017-07-07 RX ADMIN — Medication 10 ML: at 22:24

## 2017-07-07 RX ADMIN — CYCLOBENZAPRINE HYDROCHLORIDE 10 MG: 10 TABLET, FILM COATED ORAL at 13:52

## 2017-07-07 NOTE — PROGRESS NOTES
Bedside and Verbal shift change report given to Irven Siemens.  (oncoming nurse) by Tom Sam (offgoing nurse). Report included the following information SBAR and Kardex.

## 2017-07-07 NOTE — CONSULTS
Gyn Consult    Subjective:     Gil Dietz is a 40 y.o.  perimenopausal nulligravida  female who is being seen for Symptomatic anemia. Patient with a known  history of uterine Fibroids . States her menstrual cycles have always been heavy and lasted for 7-10 days. Patient  underwent  Myomectomy in [place] in   and her cycles got lighter for a few years and then she had moved to [place] for a couple of years. Her Bleeding got heavier again and in  in [place] she was seen by the gynecologist and was told that her Fibroids were getting bigger and advised hysterectomy.  has had Iron Infusions in the past due to Anemia and her last infusion was 5-6 months ago. She moved back to [place] in January. States that for the last 4 months her bleeding has been irregular and with just spotting now and then. States this morning she had leg pain with shortness of breath which [facility].    Past Medical history significant for  Morbid Obesity(  has lost 50 lbs- was 300 lbs). Sleep Apnea and Chronic Hypertension(not on meds at this time)  Desires pregnancy .  has been in a  Stable relationship. OB/GYN ROS: she complains of shortness of breath and leg swelling  OB/GYN history: history of (Uterine Fibroids and menorrhagia) and obstetric history: ( : 0, Para: 0, Misc/Ab: 0)    Patient Active Problem List    Diagnosis Date Noted    Generalized abdominal pain 2017    Symptomatic anemia 2017     Past Medical History:   Diagnosis Date    Anemia     Fibroids     Hypertension     Uterine fibroid       Past Surgical History:   Procedure Laterality Date    HX MYOMECTOMY      HX MYOMECTOMY      INSERT MESH/PELVIC FLR ADDON        Social History   Substance Use Topics    Smoking status: Former Smoker     Quit date: 2017    Smokeless tobacco: Not on file    Alcohol use No      Comment: \"not really\"      History reviewed.  No pertinent family history.    None     No Known Allergies     Review of Systems:  10 point ROS,  Constitutional: negative  Respiratory: shortness of breath  Cardiovascular: negative  Gastrointestinal: negative  Genitourinary:Uterine Fibroids  Musculoskeletal:negative  Neurological: negative  Behavioral/Psychiatric: negative     Objective:     Patient Vitals for the past 8 hrs:   BP Temp Pulse Resp SpO2 Height Weight   17 2100 (!) 150/95 - 74 22 100 % - -   17 (!) 145/91 - 76 14 100 % - -   17 145/88 - 77 17 100 % - -   17 1933 149/87 98.1 °F (36.7 °C) 73 18 100 % - -   17 1930 149/87 - 79 21 99 % - -   17 1913 152/90 98.2 °F (36.8 °C) 76 18 100 % - -   17 1900 (!) 157/94 98.1 °F (36.7 °C) 73 18 100 % - -   17 1855 148/79 97.3 °F (36.3 °C) 86 22 100 % - -   17 1830 147/58 - 76 16 100 % - -   17 1702 138/85 - 80 18 100 % - -   17 1628 (!) 172/106 97.2 °F (36.2 °C) 83 18 100 % 5' 5\" (1.651 m) 113.4 kg (250 lb)     Temp (24hrs), Av.8 °F (36.6 °C), Min:97.2 °F (36.2 °C), Max:98.2 °F (36.8 °C)         Physical Exam:   Visit Vitals    /85 (BP 1 Location: Right arm, BP Patient Position: At rest)    Pulse 79    Temp 98 °F (36.7 °C)    Resp 18    Ht 5' 5\" (1.651 m)    Wt 113.4 kg (250 lb)    LMP 2017 (Approximate)    SpO2 97%    BMI 41.6 kg/m2     General appearance: alert, cooperative, morbidly obese  Lungs: clear to auscultation bilaterally  Heart: regular rate and rhythm  Abdomen: soft, obese, uterus palpable upto 20 weeks size  Pelvic: deferred  Extremities: edema Bilateral 1+  Neurologic: Grossly normal    Labs:    Recent Results (from the past 24 hour(s))   HCG URINE, QL. - POC    Collection Time: 17  4:43 PM   Result Value Ref Range    Pregnancy test,urine (POC) NEGATIVE  NEG     CBC WITH AUTOMATED DIFF    Collection Time: 17  4:58 PM   Result Value Ref Range    WBC 6.8 3.6 - 11.0 K/uL    RBC 2.64 (L) 3.80 - 5.20 M/uL    HGB 4.2 (LL) 11.5 - 16.0 g/dL    HCT 16.4 (LL) 35.0 - 47.0 %    MCV 62.1 (L) 80.0 - 99.0 FL    MCH 15.9 (L) 26.0 - 34.0 PG    MCHC 25.6 (L) 30.0 - 36.5 g/dL    RDW 30.1 (H) 11.5 - 14.5 %    PLATELET 97 (L) 508 - 400 K/uL    NEUTROPHILS 58 32 - 75 %    LYMPHOCYTES 25 12 - 49 %    MONOCYTES 7 5 - 13 %    EOSINOPHILS 8 (H) 0 - 7 %    BASOPHILS 2 (H) 0 - 1 %    ABS. NEUTROPHILS 4.0 1.8 - 8.0 K/UL    ABS. LYMPHOCYTES 1.7 0.8 - 3.5 K/UL    ABS. MONOCYTES 0.5 0.0 - 1.0 K/UL    ABS. EOSINOPHILS 0.5 (H) 0.0 - 0.4 K/UL    ABS. BASOPHILS 0.1 0.0 - 0.1 K/UL    DF MANUAL      PLATELET COMMENTS LARGE PLATELETS      RBC COMMENTS ANISOCYTOSIS  4+        RBC COMMENTS HYPOCHROMIA  4+        RBC COMMENTS MICROCYTOSIS  PRESENT        RBC COMMENTS OVALOCYTES  PRESENT        RBC COMMENTS Pathology Review Requested     TYPE & SCREEN    Collection Time: 07/06/17  4:58 PM   Result Value Ref Range    Crossmatch Expiration 07/09/2017     ABO/Rh(D) O POSITIVE     Antibody screen NEG     Unit number [id number]     Blood component type  LR AS1     Unit division 00     Status of unit ISSUED     Crossmatch result Compatible     Unit number [id number]     Blood component type  LR AS1     Unit division 00     Status of unit ALLOCATED     Crossmatch result Compatible    METABOLIC PANEL, COMPREHENSIVE    Collection Time: 07/06/17  4:58 PM   Result Value Ref Range    Sodium 139 136 - 145 mmol/L    Potassium 3.9 3.5 - 5.1 mmol/L    Chloride 107 97 - 108 mmol/L    CO2 25 21 - 32 mmol/L    Anion gap 7 5 - 15 mmol/L    Glucose 91 65 - 100 mg/dL    BUN 5 (L) 6 - 20 MG/DL    Creatinine 0.77 0.55 - 1.02 MG/DL    BUN/Creatinine ratio 6 (L) 12 - 20      GFR est AA >60 >60 ml/min/1.73m2    GFR est non-AA >60 >60 ml/min/1.73m2    Calcium 8.8 8.5 - 10.1 MG/DL    Bilirubin, total 1.6 (H) 0.2 - 1.0 MG/DL    ALT (SGPT) 25 12 - 78 U/L    AST (SGOT) 13 (L) 15 - 37 U/L    Alk.  phosphatase 73 45 - 117 U/L    Protein, total 7.1 6.4 - 8.2 g/dL Albumin 3.5 3.5 - 5.0 g/dL    Globulin 3.6 2.0 - 4.0 g/dL    A-G Ratio 1.0 (L) 1.1 - 2.2     URINALYSIS W/ REFLEX CULTURE    Collection Time: 07/06/17  4:58 PM   Result Value Ref Range    Color YELLOW/STRAW      Appearance CLOUDY (A) CLEAR      Specific gravity 1.015 1.003 - 1.030      pH (UA) 7.0 5.0 - 8.0      Protein 300 (A) NEG mg/dL    Glucose NEGATIVE  NEG mg/dL    Ketone NEGATIVE  NEG mg/dL    Bilirubin NEGATIVE  NEG      Blood NEGATIVE  NEG      Urobilinogen 1.0 0.2 - 1.0 EU/dL    Nitrites NEGATIVE  NEG      Leukocyte Esterase NEGATIVE  NEG      WBC 0-4 0 - 4 /hpf    RBC 0-5 0 - 5 /hpf    Epithelial cells FEW FEW /lpf    Bacteria NEGATIVE  NEG /hpf    UA:UC IF INDICATED CULTURE NOT INDICATED BY UA RESULT CNI     OCCULT BLOOD, STOOL    Collection Time: 07/06/17  6:12 PM   Result Value Ref Range    Occult blood, stool NEGATIVE  NEG         Imaging: CT scan    EXAM: CT ABDOMEN PELVIS WITH CONTRAST  INDICATION:  Abdominal pain and vomiting. COMPARISON: None. CONTRAST: 100 mL of Isovue-370.     TECHNIQUE:   Multislice helical CT was performed from the diaphragm to the symphysis pubis  during uneventful rapid bolus intravenous contrast administration. Oral contrast  was not administered. Contiguous 5 mm axial images were reconstructed and lung  and soft tissue windows were generated. Coronal and sagittal reformations were  generated. CT dose reduction was achieved through use of a standardized protocol  tailored for this examination and automatic exposure control for dose  modulation.     FINDINGS:  The patient is morbidly obese. LOWER CHEST: The visualized portions of the lung bases are clear.     ABDOMEN:  Imaging of the abdomen was performed prior to optimal enhancement of the liver. The hepatic veins are not enhanced and the parenchyma is incompletely enhanced. Liver: The liver is normal in size and contour with no focal abnormality. Gallbladder and bile ducts:  There is a calcified stone in the gallbladder. Spleen: No abnormality. Pancreas: No abnormality. Adrenal glands: No abnormality. Kidneys: No abnormality.     PELVIS:  Reproductive organs: The uterus is enlarged and nodular in contour measuring  15.1 x 14 x 19 cm. There is a suggestion of a large endometrial mass with  slightly increased attenuation. The ovaries are not visualized. Bladder: No abnormality.      BOWEL AND MESENTERY: The small bowel is normal.  There is no mesenteric mass or  adenopathy. The appendix is normal.     PERITONEUM: There is no ascites or free intraperitoneal air.     RETROPERITONEUM: The aorta  tapers without aneurysm. There is no retroperitoneal  adenopathy or mass. There is no pelvic mass or adenopathy.     BONES AND SOFT TISSUES: There is diffuse body edema. The bones are within normal  limits.     IMPRESSION  IMPRESSION:   1. Markedly enlarged uterus . This could all represent fibroids, however  neoplasm is not excluded. 2. Pericardial effusion. 3. Diffuse body edema. 4. Gallstone. .           Assessment:     Active Problems:    Generalized abdominal pain (2017)      Symptomatic anemia (2017)     Fibroid Uterus  Morbid Obesity  Sleep Apnea    Plan:     I reviewed with Kevin Rivas her medical records, physical exam, and review of symptoms. Patient admitted for Blood transfusion  Patient has a known history of Fibroids and Myomectomy  Discussed outpatient follow up with Gyn for Endometrial Biopsy and discuss further Surgical options  Patient verbalis es understanding and All questions answered.         Signed By: Aisha Apgar, MD     2017

## 2017-07-07 NOTE — ROUTINE PROCESS
TRANSFER - OUT REPORT:    Verbal report given to Soledad RN(name) on [facility]  being transferred to (unit) for routine progression of care       Report consisted of patients Situation, Background, Assessment and   Recommendations(SBAR). Information from the following report(s) SBAR, Kardex, ED Summary and MAR was reviewed with the receiving nurse. Lines:   Peripheral IV 07/06/17 Left Antecubital (Active)        Opportunity for questions and clarification was provided.       Patient transported with:   [place]

## 2017-07-07 NOTE — PROGRESS NOTES
11:45 AM  Patient reviewed in rounds. CM received a consult for PCP and OBGYN. CM met with patient at bedside. Patient was alert and oriented. CM introduced self and explained transitions of care role. CM reviewed with patient disposition needs. Patient has selected [address] [place] [address] [phone] for follow-up services needed. CM contacted facility and was able to schedule patient appointment on July 26, 2017 at 39:45 am with Dr. Danyelle Thomas. CM informed patient of updates and placed on AVS.    Patient has selected [facility] for PCP. CM contacted CM Specialist to schedule an appointment for patient. CM Specialist to place appointment on AVS.    There are no other CM consults or needs at this time. CM will continue to follow and assist with disposition needs as they arise.     OMERO Garcia/TARAS

## 2017-07-07 NOTE — PROGRESS NOTES
Hospitalist Progress Note  Office: [phone]      Date of Service:  2017  NAME:  Se Tapia  :  1980  MRN:  [id number]      Admission Summary:   39 yo woman with uterine fibroids s/p myomectomy, HTN, sleep apnea, morbid obesity, and chronic anemia was sent from [facility] on 17 with Hb 4.4, platelet count 90,262 and bacteriuria. Interval history / Subjective:   c/o headache and SOB; currently getting blood transfusion; no uterine/vaginal bleeding currently      Assessment & Plan:     Acute symptomatic anemia (POA)  - likely due to bleeding uterine fibroids  - FOBT negative in ED  - s/p 2 units PRBC transfusion and ordered 3 more units today  - Gyn consulted    Uterine fibroids s/p remote myomectomy  - seen by Gyn on  and discussed outpatient endometrial bx  - pain control    Morbid obesity, Body mass index is 41.6 kg/(m^2). LLE > RLE edema (POA) - LLE venous duplex  negative DVT    HTN, uncontrolled   - will give Lasix for now during BTs  - start lisinopril/HCTZ  - stated she was on amlodipine until about 2 years ago but stopped because BP ws better after weight loss    Code status:   DVT prophylaxis: Alisha Ibarra discussed with: Patient/Family, Nurse and   Disposition: TBD. Hospital Problems  Date Reviewed: 2017          Codes Class Noted POA    Generalized abdominal pain ICD-10-CM: R10.84  ICD-9-CM: 789.07  2017 Unknown        * (Principal)Symptomatic anemia ICD-10-CM: D64.9  ICD-9-CM: 285.9  2017 Unknown            Review of Systems:   Pertinent items are noted in HPI. Vital Signs:    Last 24hrs VS reviewed since prior progress note.  Most recent are:  Visit Vitals    BP (!) 182/116 (BP 1 Location: Right arm, BP Patient Position: At rest)    Pulse 78    Temp 97.8 °F (36.6 °C)    Resp 18    Ht 5' 5\" (1.651 m)    Wt 113.4 kg (250 lb)    SpO2 99%    BMI 41.6 kg/m2       Intake/Output Summary (Last 24 hours) at 07/07/17 1504  Last data filed at 07/07/17 1443   Gross per 24 hour   Intake            639.6 ml   Output              225 ml   Net            414.6 ml      Physical Examination:     Constitutional:  awake, no acute distress, cooperative, pleasant, obese   ENT:  oral mucosa moist, oropharynx benign  Neck supple, no masses   Resp:  CTA bilaterally, no wheezing/rhonchi/rales   CV:  regular rhythm, normal rate, no m/r/g appreciated, b/l LE nonpitting edema, +pulses    GI:  +BS, soft, non distended, non tender, obese      Musculoskeletal:  moves all extremities    Neurologic:  AAOx3, NFD     Skin:  warm, dry  Eyes:  PERRL    Data Review:    Review and/or order of clinical lab test  Review and/or order of tests in the radiology section of CPT  Review and/or order of tests in the medicine section of CPT    Labs:     Recent Labs      07/07/17   0632  07/06/17   1658   WBC  8.5  6.8   HGB  5.8*  4.2*   HCT  20.5*  16.4*   PLT  133*  97*     Recent Labs      07/07/17   0632  07/06/17   1658   NA  138  139   K  3.8  3.9   CL  105  107   CO2  25  25   BUN  5*  5*   CREA  0.72  0.77   GLU  90  91   CA  8.5  8.8     Recent Labs      07/06/17   1658   SGOT  13*   ALT  25   AP  73   TBILI  1.6*   TP  7.1   ALB  3.5   GLOB  3.6     No results for input(s): INR, PTP, APTT in the last 72 hours. No lab exists for component: INREXT   Recent Labs      07/06/17   1658   TIBC  467*   PSAT  4*      No results found for: FOL, RBCF   No results for input(s): PH, PCO2, PO2 in the last 72 hours. No results for input(s): CPK, CKNDX, TROIQ in the last 72 hours.     No lab exists for component: CPKMB  No results found for: CHOL, CHOLX, CHLST, CHOLV, HDL, LDL, LDLC, DLDLP, TGLX, TRIGL, TRIGP, CHHD, CHHDX  No results found for: [facility]  Lab Results   Component Value Date/Time    Color YELLOW/STRAW 07/06/2017 04:58 PM    Appearance CLOUDY 07/06/2017 04:58 PM    Specific gravity 1.015 07/06/2017 04:58 PM    pH (UA) 7.0 07/06/2017 04:58 PM    Protein 300 07/06/2017 04:58 PM    Glucose NEGATIVE  07/06/2017 04:58 PM    Ketone NEGATIVE  07/06/2017 04:58 PM    Bilirubin NEGATIVE  07/06/2017 04:58 PM    Urobilinogen 1.0 07/06/2017 04:58 PM    Nitrites NEGATIVE  07/06/2017 04:58 PM    Leukocyte Esterase NEGATIVE  07/06/2017 04:58 PM    Epithelial cells FEW 07/06/2017 04:58 PM    Bacteria NEGATIVE  07/06/2017 04:58 PM    WBC 0-4 07/06/2017 04:58 PM    RBC 0-5 07/06/2017 04:58 PM     Medications Reviewed:     Current Facility-Administered Medications   Medication Dose Route Frequency    0.9% sodium chloride infusion 250 mL  250 mL IntraVENous PRN    furosemide (LASIX) injection 10 mg  10 mg IntraVENous ONCE    furosemide (LASIX) injection 20 mg  20 mg IntraVENous ONCE    cyclobenzaprine (FLEXERIL) tablet 10 mg  10 mg Oral TID PRN    sodium chloride (NS) flush 5-10 mL  5-10 mL IntraVENous Q8H    sodium chloride (NS) flush 5-10 mL  5-10 mL IntraVENous PRN     ______________________________________________________________________  EXPECTED LENGTH OF STAY: 2d 19h  ACTUAL LENGTH OF STAY:          1                 Jw Lisa MD

## 2017-07-07 NOTE — PROGRESS NOTES
Admission Medication Reconciliation:    Information obtained from: Patient    Significant PMH/Disease States:   Past Medical History:   Diagnosis Date    Anemia     Fibroids     Hypertension     Uterine fibroid        Chief Complaint for this Admission:  Abnormal lab results    Allergies:  Review of patient's allergies indicates no known allergies. Prior to Admission Medications:   None         Comments/Recommendations: Patient reports no active medications, prescription or over the counter.     Sue Schumacher, MusaD, BCPS

## 2017-07-07 NOTE — H&P
[facility] [address]   HISTORY AND PHYSICAL       Name:  Roxy Pham   MR#:  [id number]   :  1980   Account #:  [de-identified]        Date of Adm:  2017       CHIEF COMPLAINT: Abdominal pain. HISTORY OF PRESENT ILLNESS: A 27-year-old Pending sale to [facility]   female with past medical history of uterine fibroids, hypertension and   anemia, presented to the emergency department from home with chief   complaint of abdominal pain. The patient notes she has been having   abdominal pain for over the past 2 months, generalized, dull, achy,   mostly located in the periumbilical region, described as a \"knot in my   stomach,\" dull, achy, crampy that is moderate to severe without   specific exacerbating or alleviating factors. She rates the pain at 7/10. She complains of having nausea, vomiting, shortness of breath,   fatigue, lightheadedness according to her initial ER report. Today, she   reportedly went to [facility] and was noted to have a hemoglobin of   4.4. She was referred to the emergency department, according the ER   report. The patient has not had any blood transfusion in the past,   however, she has had a prior iron transfusion, according to chart   records. She reports her last normal menstrual period was   approximately 4 months ago. She does not report any current vaginal   bleeding or vaginal discharge. She does not complain of any syncope,   loss of consciousness, numbness, paresthesias, slurred speech, facial   droop, headache, neck pain, back pain, chest pain, shortness of   breath, cough, congestion, sore throat, melena, diarrhea, dysuria,   hematuria. She complains of swelling in her left calf. There have been   no reports of having prolonged travel, immobilization. She does not   report being on oral contraceptive pills.     On arrival in the emergency department, her initial recorded vital signs   were blood pressure 172/106, heart rate 83, respiratory rate 18, O2   saturation 100% on room air. She notes that she has had history of   hypertension in the past, but was told that she did not need any blood   pressure medication. Currently, she does not have a PCP. Labs   showed a hemoglobin of 4.2, MCV 62.1, MCHC 25.6, platelets 97. CT   of the abdomen and pelvis with IV contrast showed markedly enlarged   uterus, possible fibroids, but neoplasm not excluded, with pericardial   effusion, diffuse body edema and gallstones. Chest X-ray, PA and   lateral, showed cardiomegaly. Left lower extremity venous duplex was   negative for evidence of deep vein thrombosis. ER consulted OB/GYN. The patient was seen for evaluation and was told she may be   evaluated outpatient for GYN pelvic exam. The patient notes her last   Pap smear was performed approximately a year ago, and per her   knowledge it was normal. The patient is now seen for admission to the   hospitalist service for continued evaluation and treatment. PAST MEDICAL HISTORY       1. Anemia. 2. Fibroids. 3. Hypertension. PAST SURGICAL HISTORY: Myomectomy. MEDICATIONS: None. ALLERGIES: NO KNOWN DRUG ALLERGIES. SOCIAL HISTORY: She reports she quit smoking in February or March 2017. Positive for occasional alcohol. Admits marijuana, last use was 2   days ago. FAMILY HISTORY: Unknown in regards to heart attack, stroke and   cancers. REVIEW OF SYSTEMS   Eleven systems reviewed, pertinent positives as per HPI,   otherwise negative. PHYSICAL EXAMINATION   VITAL SIGNS: Temperature is 97.2 degrees Fahrenheit, blood   pressure is 141/85, heart rate 78, respiratory rate 17, O2 saturation   100% on room air. Recorded weight is 250 pounds (113.4 kg),   recorded height of 5 foot 5 inches tall. GENERAL: Obese female in no acute respiratory distress. PSYCH: The patient is awake, alert, oriented x3. NEUROLOGIC: GCS of 15, moves extremities x4.  Sensation is intact   without slurred speech or facial droop. HEENT: Normocephalic, atraumatic. PERRLA. Sclerae anicteric. Conjunctivae clear. Nares patent. Oropharynx is clear. Tongue is   midline, nonedematous. NECK: Supple, without lymphadenopathy, JVD, carotid bruits,  or   thyromegaly. LYMPH: Negative for cervical or supraclavicular lymphadenopathy. RESPIRATORY: Lungs are clear to auscultation bilaterally. CARDIOVASCULAR: Heart is regular rate and rhythm. Normal S1, S2,   without murmurs, rubs or gallops. GASTROINTESTINAL: Abdomen is soft. Generalized tenderness, per   patient, mostly in the supraumbilical region, overlying a palpable hard   mass. No rebound, guarding or rigidity, no auscultated abdominal   bruits, no palpable abdominal mass. BACK: No CVA tenderness. No stepoffs. MUSCULOSKELETAL: No acute palpable bony deformity. Tender to   palpation of the left calf which is swollen and edematous. VASCULAR: 2+ radial and 1+ dorsalis pedis pulses without cyanosis   or clubbing. Marked nonpitting edema bilateral lower extremity, left   greater than right. SKIN: Warm and dry. LABORATORY DATA: I reviewed as follows: Sodium 139, potassium   3.9, chloride 107, CO2 25, BUN 5, creatinine 0.77, glucose 91, anion   gap 7, calcium 8.8, GFR greater than 60. Total bilirubin 1.6, total   protein 7.1, albumin 3.5, ALT 25, AST 13, alkaline phosphatase 73. WBC 6.8, hemoglobin 4.2, hematocrit 16.4, platelets 97, neutrophils   58%. Urinalysis: Leukocyte esterase negative, nitrites negative,   urobilinogen 1.0, bilirubin negative, blood negative, ketones negative,   glucose negative, protein 300, pH 7.0, specific gravity 1.015, WBCs 0-  4, RBCs 0-5, bacteria negative, stool occult blood test negative. CT abdomen and pelvis with contrast, results, I reviewed and noted in   the HPI. Chest x-ray, PA and lateral and venous duplex of the left   lower extremity, results are noted in the HPI. IMPRESSION/PLAN    1.  Symptomatic anemia--microcytic hypochromic. Admit to medical   floor. Transfuse 2 units of packed red blood cells. Repeat CBC post   transfusion. Check iron  profile, vitamin B12, folate levels. Per ER   ED rectal examination, the patient was hemoccult negative. 2. Thrombocytopenia. Repeat platelet count. 3. Uterine/abdominal mass, possible fibroids. Rule out neoplasm. The   patient was already seen by OB/GYN and per report, the patient has   instructions to follow with outpatient GYN for pelvic examination   and further testing and treatments. 4. Left greater than right bilateral lower extremity edema, negative for   deep vein thrombosis in the left lower extremity. Keep legs elevated at   rest. Monitor closely. 5. Hypertension. Blood pressure is uncontrolled. Would recommend   starting antihypertensive medication. May start with   hydrochlorothiazide. I informed the patient that her blood pressure is   high and she will likely treatment. We will check blood pressure   readings, and if it continues to be elevated, then would recommend   starting in the morning. 6. Nausea and vomiting. Provide Zofran p.r.n. Continue IV fluids   hydration. 7. Generalized abdominal pain. The patient has a uterine mass, but no   other significant acute findings at this time. The patient will be resumed   back on her diet as tolerated. 8. Venous thromboembolism prophylaxis, sequential compression   devices on bilateral lower extremities. No anticoagulants, as the patient   is already thrombocytopenic and anemic. MANOJ Reese MD MP / DANN   D:  07/06/2017   22:15   T:  07/07/2017   06:32   Job #:  [id number]

## 2017-07-07 NOTE — PROGRESS NOTES
Spiritual Care Partner Volunteer visited patient in Oncology on 7/7/17.   Documented by:  Aurora Lucas [id number] Latanya Chandler ([id number])

## 2017-07-07 NOTE — PROGRESS NOTES
TRANSFER - IN REPORT:    Verbal report received from Dayami(name) on Mónica Stanley  being received from ED(unit) for routine progression of care      Report consisted of patients Situation, Background, Assessment and   Recommendations(SBAR). Information from the following report(s) SBAR, Kardex, ED Summary and Recent Results was reviewed with the receiving nurse. Opportunity for questions and clarification was provided. Assessment completed upon patients arrival to unit and care assumed. Primary Nurse Jarocho Iqbal RN and Earl Cole RN performed a dual skin assessment on this patient No impairment noted  Elian score is 23    0115: NP, Soledad notified of pt BP. Told to continue treatment of transfusion and monitor pt as body maybe responding to stress of low hgb. Will followup    0240: Transfusion complete. No sxs reaction noted.  VS documented

## 2017-07-07 NOTE — ED NOTES
Pt ambulated to restroom with steady gait. Pt given peanut butter crackers and gingerale. Tolerating well.

## 2017-07-08 LAB
ABO + RH BLD: NORMAL
ANION GAP BLD CALC-SCNC: 8 MMOL/L (ref 5–15)
BASOPHILS # BLD AUTO: 0 K/UL (ref 0–0.1)
BASOPHILS # BLD: 0 % (ref 0–1)
BLD PROD TYP BPU: NORMAL
BLOOD GROUP ANTIBODIES SERPL: NORMAL
BPU ID: NORMAL
BUN SERPL-MCNC: 5 MG/DL (ref 6–20)
BUN/CREAT SERPL: 6 (ref 12–20)
CALCIUM SERPL-MCNC: 9 MG/DL (ref 8.5–10.1)
CHLORIDE SERPL-SCNC: 104 MMOL/L (ref 97–108)
CO2 SERPL-SCNC: 28 MMOL/L (ref 21–32)
CREAT SERPL-MCNC: 0.78 MG/DL (ref 0.55–1.02)
CROSSMATCH RESULT,%XM: NORMAL
DIFFERENTIAL METHOD BLD: ABNORMAL
EOSINOPHIL # BLD: 0.6 K/UL (ref 0–0.4)
EOSINOPHIL NFR BLD: 6 % (ref 0–7)
GLUCOSE SERPL-MCNC: 111 MG/DL (ref 65–100)
HCT VFR BLD AUTO: 28.2 % (ref 35–47)
HGB BLD-MCNC: 8.4 G/DL (ref 11.5–16)
LYMPHOCYTES # BLD AUTO: 22 % (ref 12–49)
LYMPHOCYTES # BLD: 2 K/UL (ref 0.8–3.5)
MAGNESIUM SERPL-MCNC: 1.8 MG/DL (ref 1.6–2.4)
MCH RBC QN AUTO: 20.6 PG (ref 26–34)
MCHC RBC AUTO-ENTMCNC: 29.8 G/DL (ref 30–36.5)
MCV RBC AUTO: 69.3 FL (ref 80–99)
MONOCYTES # BLD: 0.5 K/UL (ref 0–1)
MONOCYTES NFR BLD AUTO: 5 % (ref 5–13)
MYELOCYTES NFR BLD MANUAL: 1 %
NEUTS SEG # BLD: 6.1 K/UL (ref 1.8–8)
NEUTS SEG NFR BLD AUTO: 66 % (ref 32–75)
NRBC # BLD: 0.13 K/UL (ref 0–0.01)
NRBC BLD-RTO: 1.4 PER 100 WBC
PHOSPHATE SERPL-MCNC: 4.3 MG/DL (ref 2.6–4.7)
PLATELET # BLD AUTO: 198 K/UL (ref 150–400)
PLATELET COMMENTS,PCOM: ABNORMAL
POTASSIUM SERPL-SCNC: 3.4 MMOL/L (ref 3.5–5.1)
RBC # BLD AUTO: 4.07 M/UL (ref 3.8–5.2)
RBC MORPH BLD: ABNORMAL
SODIUM SERPL-SCNC: 140 MMOL/L (ref 136–145)
SPECIMEN EXP DATE BLD: NORMAL
STATUS OF UNIT,%ST: NORMAL
UNIT DIVISION, %UDIV: 0
WBC # BLD AUTO: 9.2 K/UL (ref 3.6–11)
WBC MORPH BLD: ABNORMAL
WBC NRBC COR # BLD: ABNORMAL 10*3/UL

## 2017-07-08 PROCEDURE — 84100 ASSAY OF PHOSPHORUS: CPT | Performed by: INTERNAL MEDICINE

## 2017-07-08 PROCEDURE — 65270000032 HC RM SEMIPRIVATE

## 2017-07-08 PROCEDURE — 74011250637 HC RX REV CODE- 250/637: Performed by: INTERNAL MEDICINE

## 2017-07-08 PROCEDURE — 74011250636 HC RX REV CODE- 250/636: Performed by: INTERNAL MEDICINE

## 2017-07-08 PROCEDURE — 80048 BASIC METABOLIC PNL TOTAL CA: CPT | Performed by: INTERNAL MEDICINE

## 2017-07-08 PROCEDURE — 74011250636 HC RX REV CODE- 250/636: Performed by: FAMILY MEDICINE

## 2017-07-08 PROCEDURE — 83735 ASSAY OF MAGNESIUM: CPT | Performed by: INTERNAL MEDICINE

## 2017-07-08 PROCEDURE — 36415 COLL VENOUS BLD VENIPUNCTURE: CPT | Performed by: INTERNAL MEDICINE

## 2017-07-08 PROCEDURE — 85025 COMPLETE CBC W/AUTO DIFF WBC: CPT | Performed by: INTERNAL MEDICINE

## 2017-07-08 RX ORDER — POTASSIUM CHLORIDE 750 MG/1
40 TABLET, FILM COATED, EXTENDED RELEASE ORAL
Status: COMPLETED | OUTPATIENT
Start: 2017-07-08 | End: 2017-07-08

## 2017-07-08 RX ORDER — LISINOPRIL 20 MG/1
40 TABLET ORAL DAILY
Status: DISCONTINUED | OUTPATIENT
Start: 2017-07-09 | End: 2017-07-09 | Stop reason: HOSPADM

## 2017-07-08 RX ORDER — FUROSEMIDE 10 MG/ML
40 INJECTION INTRAMUSCULAR; INTRAVENOUS ONCE
Status: COMPLETED | OUTPATIENT
Start: 2017-07-08 | End: 2017-07-08

## 2017-07-08 RX ORDER — LISINOPRIL 20 MG/1
20 TABLET ORAL ONCE
Status: COMPLETED | OUTPATIENT
Start: 2017-07-08 | End: 2017-07-08

## 2017-07-08 RX ORDER — HYDRALAZINE HYDROCHLORIDE 20 MG/ML
10 INJECTION INTRAMUSCULAR; INTRAVENOUS ONCE
Status: COMPLETED | OUTPATIENT
Start: 2017-07-08 | End: 2017-07-08

## 2017-07-08 RX ADMIN — FUROSEMIDE 40 MG: 10 INJECTION, SOLUTION INTRAMUSCULAR; INTRAVENOUS at 11:15

## 2017-07-08 RX ADMIN — Medication 10 ML: at 06:00

## 2017-07-08 RX ADMIN — HYDRALAZINE HYDROCHLORIDE 10 MG: 20 INJECTION INTRAMUSCULAR; INTRAVENOUS at 21:18

## 2017-07-08 RX ADMIN — Medication 10 ML: at 21:19

## 2017-07-08 RX ADMIN — POTASSIUM CHLORIDE 40 MEQ: 750 TABLET, FILM COATED, EXTENDED RELEASE ORAL at 11:14

## 2017-07-08 RX ADMIN — HYDROCHLOROTHIAZIDE: 25 TABLET ORAL at 08:40

## 2017-07-08 RX ADMIN — LISINOPRIL 20 MG: 20 TABLET ORAL at 14:49

## 2017-07-08 RX ADMIN — Medication 10 ML: at 11:15

## 2017-07-08 RX ADMIN — Medication 10 ML: at 03:26

## 2017-07-08 NOTE — PROGRESS NOTES
Jeremías's BP still elevated after giving morning PO medications. Paged Dr. Ofelia Mccain. Awaiting return call. [phone]: Spoke with Dr. Ofelia Mccain and updated on patient's status. Per MD, she will review the chart and place orders.

## 2017-07-08 NOTE — PROGRESS NOTES
Hospitalist Progress Note  Office: [phone]      Date of Service:  2017  NAME:  Nimisha Archuleta  :  1980  MRN:  [id number]      Admission Summary:   39 yo woman with uterine fibroids s/p myomectomy, HTN, sleep apnea, morbid obesity, and chronic anemia was sent from [facility] on 17 with Hb 4.4, platelet count 52,627 and bacteriuria. Interval history / Subjective:   Denies complaints currently; BP has been highly uncontrolled but she's urinating a lot from the multiple doses of Lasix      Assessment & Plan:     Acute symptomatic anemia (POA)  - likely due to bleeding uterine fibroids  - FOBT negative in ED  - s/p 5 units PRBC transfusion since admission; H/H much improved now  - Gyn consulted    Uterine fibroids s/p remote myomectomy  - seen by Gyn on  and discussed outpatient endometrial bx  - pain control    Morbid obesity, Body mass index is 41.6 kg/(m^2). LLE > RLE edema (POA) - LLE venous duplex  negative DVT    HTN, uncontrolled   - will give Lasix for now during BTs  - started lisinopril/HCTZ but will change to high dose lisinopril alone  - stated she was on amlodipine until about 2 years ago but stopped because BP ws better after weight loss    Hypokalemia - likely due to multiple doses of Lasix; replete prn    Code status:   DVT prophylaxis: Alisha Ibarra discussed with: Patient/Family, Nurse and   Disposition: TBD. Hospital Problems  Date Reviewed: 2017          Codes Class Noted POA    Generalized abdominal pain ICD-10-CM: R10.84  ICD-9-CM: 789.07  2017 Unknown        * (Principal)Symptomatic anemia ICD-10-CM: D64.9  ICD-9-CM: 285.9  2017 Unknown            Review of Systems:   Pertinent items are noted in HPI. Vital Signs:    Last 24hrs VS reviewed since prior progress note.  Most recent are:  Visit Vitals    /87 (BP 1 Location: Left arm, BP Patient Position: At rest)    Pulse 81    Temp 98.4 °F (36.9 °C)    Resp 18    Ht 5' 5\" (1.651 m)    Wt 113.4 kg (250 lb)    SpO2 99%    BMI 41.6 kg/m2       Intake/Output Summary (Last 24 hours) at 07/08/17 1724  Last data filed at 07/07/17 2200   Gross per 24 hour   Intake            626.3 ml   Output             1050 ml   Net           -423.7 ml      Physical Examination:     Constitutional:  awake, no acute distress, cooperative, pleasant, obese   ENT:  oral mucosa moist, oropharynx benign  Neck supple, no masses   Resp:  CTA bilaterally, no wheezing/rhonchi/rales   CV:  regular rhythm, normal rate, no m/r/g appreciated, b/l LE nonpitting edema, +pulses    GI:  +BS, soft, non distended, non tender, obese      Musculoskeletal:  moves all extremities    Neurologic:  AAOx3, NFD     Skin:  warm, dry  Eyes:  PERRL    Data Review:    Review and/or order of clinical lab test  Review and/or order of tests in the radiology section of CPT  Review and/or order of tests in the medicine section of CPT    Labs:     Recent Labs      07/08/17   0324  07/07/17   0632   WBC  9.2  8.5   HGB  8.4*  5.8*   HCT  28.2*  20.5*   PLT  198  133*     Recent Labs      07/08/17   0324  07/07/17   0632  07/06/17   1658   NA  140  138  139   K  3.4*  3.8  3.9   CL  104  105  107   CO2  28  25  25   BUN  5*  5*  5*   CREA  0.78  0.72  0.77   GLU  111*  90  91   CA  9.0  8.5  8.8   MG  1.8   --    --    PHOS  4.3   --    --      Recent Labs      07/06/17   1658   SGOT  13*   ALT  25   AP  73   TBILI  1.6*   TP  7.1   ALB  3.5   GLOB  3.6     No results for input(s): INR, PTP, APTT in the last 72 hours. No lab exists for component: INREXT, INREXT   Recent Labs      07/06/17   1658   TIBC  467*   PSAT  4*      No results found for: FOL, RBCF   No results for input(s): PH, PCO2, PO2 in the last 72 hours. No results for input(s): CPK, CKNDX, TROIQ in the last 72 hours.     No lab exists for component: CPKMB  No results found for: CHOL, CHOLX, CHLST, CHOLV, HDL, LDL, LDLC, DLDLP, TGLX, TRIGL, TRIGP, CHHD, CHHDX  No results found for: [facility]  Lab Results   Component Value Date/Time    Color YELLOW/STRAW 07/06/2017 04:58 PM    Appearance CLOUDY 07/06/2017 04:58 PM    Specific gravity 1.015 07/06/2017 04:58 PM    pH (UA) 7.0 07/06/2017 04:58 PM    Protein 300 07/06/2017 04:58 PM    Glucose NEGATIVE  07/06/2017 04:58 PM    Ketone NEGATIVE  07/06/2017 04:58 PM    Bilirubin NEGATIVE  07/06/2017 04:58 PM    Urobilinogen 1.0 07/06/2017 04:58 PM    Nitrites NEGATIVE  07/06/2017 04:58 PM    Leukocyte Esterase NEGATIVE  07/06/2017 04:58 PM    Epithelial cells FEW 07/06/2017 04:58 PM    Bacteria NEGATIVE  07/06/2017 04:58 PM    WBC 0-4 07/06/2017 04:58 PM    RBC 0-5 07/06/2017 04:58 PM     Medications Reviewed:     Current Facility-Administered Medications   Medication Dose Route Frequency    [START ON 7/9/2017] lisinopril (PRINIVIL, ZESTRIL) tablet 40 mg  40 mg Oral DAILY    0.9% sodium chloride infusion 250 mL  250 mL IntraVENous PRN    cyclobenzaprine (FLEXERIL) tablet 10 mg  10 mg Oral TID PRN    acetaminophen (TYLENOL) tablet 650 mg  650 mg Oral Q6H PRN    lisinopril/hydroCHLOROthiazide(PRINZIDE/ZESTORETIC) 20/12.5 mg   Oral DAILY    sodium chloride (NS) flush 5-10 mL  5-10 mL IntraVENous Q8H    sodium chloride (NS) flush 5-10 mL  5-10 mL IntraVENous PRN     ______________________________________________________________________  EXPECTED LENGTH OF STAY: 2d 19h  ACTUAL LENGTH OF STAY:          2                 Valeri Marti MD

## 2017-07-08 NOTE — PROGRESS NOTES
Bedside and Verbal shift change report given to Teodora Louis RN (oncoming nurse) by Kenny Zamora RN (offgoing nurse). Report included the following information SBAR, Kardex, MAR and Recent Results.

## 2017-07-08 NOTE — PROGRESS NOTES
Bedside shift change report given to Derian Souza RN (oncoming nurse) by Lord Barak RN (offgoing nurse). Report included the following information SBAR.

## 2017-07-09 VITALS
SYSTOLIC BLOOD PRESSURE: 142 MMHG | WEIGHT: 250 LBS | OXYGEN SATURATION: 98 % | RESPIRATION RATE: 18 BRPM | BODY MASS INDEX: 41.65 KG/M2 | TEMPERATURE: 98.2 F | HEIGHT: 65 IN | DIASTOLIC BLOOD PRESSURE: 88 MMHG | HEART RATE: 76 BPM

## 2017-07-09 PROBLEM — D64.9 SYMPTOMATIC ANEMIA: Status: RESOLVED | Noted: 2017-07-06 | Resolved: 2017-07-09

## 2017-07-09 PROBLEM — I10 HYPERTENSION: Chronic | Status: ACTIVE | Noted: 2017-07-09

## 2017-07-09 PROBLEM — E66.01 MORBID OBESITY (HCC): Chronic | Status: ACTIVE | Noted: 2017-07-09

## 2017-07-09 PROBLEM — D21.9 FIBROIDS: Chronic | Status: ACTIVE | Noted: 2017-07-09

## 2017-07-09 PROBLEM — R60.0 BILATERAL LEG EDEMA: Status: RESOLVED | Noted: 2017-07-06 | Resolved: 2017-07-09

## 2017-07-09 PROBLEM — E87.6 HYPOKALEMIA: Status: RESOLVED | Noted: 2017-07-09 | Resolved: 2017-07-09

## 2017-07-09 LAB
ANION GAP BLD CALC-SCNC: 10 MMOL/L (ref 5–15)
BASOPHILS # BLD AUTO: 0 K/UL (ref 0–0.1)
BASOPHILS # BLD: 0 % (ref 0–1)
BUN SERPL-MCNC: 6 MG/DL (ref 6–20)
BUN/CREAT SERPL: 8 (ref 12–20)
CALCIUM SERPL-MCNC: 9 MG/DL (ref 8.5–10.1)
CHLORIDE SERPL-SCNC: 103 MMOL/L (ref 97–108)
CO2 SERPL-SCNC: 26 MMOL/L (ref 21–32)
CREAT SERPL-MCNC: 0.79 MG/DL (ref 0.55–1.02)
DIFFERENTIAL METHOD BLD: ABNORMAL
EOSINOPHIL # BLD: 0.5 K/UL (ref 0–0.4)
EOSINOPHIL NFR BLD: 5 % (ref 0–7)
GLUCOSE SERPL-MCNC: 100 MG/DL (ref 65–100)
HCT VFR BLD AUTO: 29.7 % (ref 35–47)
HGB BLD-MCNC: 8.9 G/DL (ref 11.5–16)
LYMPHOCYTES # BLD AUTO: 19 % (ref 12–49)
LYMPHOCYTES # BLD: 2.1 K/UL (ref 0.8–3.5)
MAGNESIUM SERPL-MCNC: 1.6 MG/DL (ref 1.6–2.4)
MCH RBC QN AUTO: 20.8 PG (ref 26–34)
MCHC RBC AUTO-ENTMCNC: 30 G/DL (ref 30–36.5)
MCV RBC AUTO: 69.4 FL (ref 80–99)
MONOCYTES # BLD: 0.8 K/UL (ref 0–1)
MONOCYTES NFR BLD AUTO: 7 % (ref 5–13)
MYELOCYTES NFR BLD MANUAL: 1 %
NEUTS SEG # BLD: 7.4 K/UL (ref 1.8–8)
NEUTS SEG NFR BLD AUTO: 68 % (ref 32–75)
NRBC # BLD: 0.18 K/UL (ref 0–0.01)
NRBC BLD-RTO: 1.6 PER 100 WBC
PHOSPHATE SERPL-MCNC: 3.6 MG/DL (ref 2.6–4.7)
PLATELET # BLD AUTO: 305 K/UL (ref 150–400)
PLATELET COMMENTS,PCOM: ABNORMAL
POTASSIUM SERPL-SCNC: 3.4 MMOL/L (ref 3.5–5.1)
RBC # BLD AUTO: 4.28 M/UL (ref 3.8–5.2)
RBC MORPH BLD: ABNORMAL
SODIUM SERPL-SCNC: 139 MMOL/L (ref 136–145)
WBC # BLD AUTO: 10.9 K/UL (ref 3.6–11)
WBC NRBC COR # BLD: ABNORMAL 10*3/UL

## 2017-07-09 PROCEDURE — 36415 COLL VENOUS BLD VENIPUNCTURE: CPT | Performed by: INTERNAL MEDICINE

## 2017-07-09 PROCEDURE — 84100 ASSAY OF PHOSPHORUS: CPT | Performed by: INTERNAL MEDICINE

## 2017-07-09 PROCEDURE — 83735 ASSAY OF MAGNESIUM: CPT | Performed by: INTERNAL MEDICINE

## 2017-07-09 PROCEDURE — 74011250637 HC RX REV CODE- 250/637: Performed by: FAMILY MEDICINE

## 2017-07-09 PROCEDURE — 85025 COMPLETE CBC W/AUTO DIFF WBC: CPT | Performed by: INTERNAL MEDICINE

## 2017-07-09 PROCEDURE — 80048 BASIC METABOLIC PNL TOTAL CA: CPT | Performed by: INTERNAL MEDICINE

## 2017-07-09 PROCEDURE — 74011250637 HC RX REV CODE- 250/637: Performed by: INTERNAL MEDICINE

## 2017-07-09 RX ORDER — LISINOPRIL 40 MG/1
40 TABLET ORAL DAILY
Qty: 30 TAB | Refills: 0 | Status: SHIPPED | OUTPATIENT
Start: 2017-07-09 | End: 2017-08-21

## 2017-07-09 RX ORDER — CLONIDINE HYDROCHLORIDE 0.1 MG/1
0.1 TABLET ORAL ONCE
Status: COMPLETED | OUTPATIENT
Start: 2017-07-09 | End: 2017-07-09

## 2017-07-09 RX ORDER — ACETAMINOPHEN 500 MG
1 TABLET ORAL DAILY
Qty: 1 KIT | Refills: 0 | Status: SHIPPED | OUTPATIENT
Start: 2017-07-09 | End: 2017-07-26

## 2017-07-09 RX ORDER — POTASSIUM CHLORIDE 750 MG/1
40 TABLET, FILM COATED, EXTENDED RELEASE ORAL
Status: COMPLETED | OUTPATIENT
Start: 2017-07-09 | End: 2017-07-09

## 2017-07-09 RX ADMIN — POTASSIUM CHLORIDE 40 MEQ: 750 TABLET, FILM COATED, EXTENDED RELEASE ORAL at 12:15

## 2017-07-09 RX ADMIN — Medication 10 ML: at 04:15

## 2017-07-09 RX ADMIN — CLONIDINE HYDROCHLORIDE 0.1 MG: 0.1 TABLET ORAL at 02:24

## 2017-07-09 RX ADMIN — LISINOPRIL 40 MG: 20 TABLET ORAL at 08:23

## 2017-07-09 RX ADMIN — HYDROCHLOROTHIAZIDE: 25 TABLET ORAL at 08:23

## 2017-07-09 NOTE — PROGRESS NOTES
Bedside and Verbal shift change report given to LAMONT Rosado (oncoming nurse) by Rip Velazquez RN (offgoing nurse). Report included the following information SBAR, Kardex, MAR and Recent Results.

## 2017-07-09 NOTE — PROGRESS NOTES
Bedside and Verbal shift change report given to Keeley RN (oncoming nurse) by Suly Muniz RN (offgoing nurse). Report included the following information SBAR, Kardex, Intake/Output, MAR, Recent Results and Med Rec Status.

## 2017-07-09 NOTE — DISCHARGE INSTRUCTIONS
ADDITIONAL CARE RECOMMENDATIONS:   1. Take medications as prescribed. 2. Keep appointment(s) as recommended/scheduled. 3. You have been started on lisinopril for high blood pressure. Please note your blood pressures and show to your doctor. If you desire pregnancy, you may need to be started on a different blood pressure medication. 4. Continue to stay off cigarettes. 5. Try to lose weight. DIET: Cardiac Diet    ACTIVITY: Activity as tolerated    WOUND CARE: none    EQUIPMENT needed: none       Learning About High Blood Pressure  What is high blood pressure? Blood pressure is a measure of how hard the blood pushes against the walls of your arteries. It's normal for blood pressure to go up and down throughout the day, but if it stays up, you have high blood pressure. Another name for high blood pressure is hypertension. Two numbers tell you your blood pressure. The first number is the systolic pressure. It shows how hard the blood pushes when your heart is pumping. The second number is the diastolic pressure. It shows how hard the blood pushes between heartbeats, when your heart is relaxed and filling with blood. A blood pressure of less than 120/80 (say \"120 over 80\") is ideal for an adult. High blood pressure is 140/90 or higher. You have high blood pressure if your top number is 140 or higher or your bottom number is 90 or higher, or both. Many people fall into the category in between, called prehypertension. People with prehypertension need to make lifestyle changes to bring their blood pressure down and help prevent or delay high blood pressure. What happens when you have high blood pressure? · Blood flows through your arteries with too much force. Over time, this damages the walls of your arteries. But you can't feel it. High blood pressure usually doesn't cause symptoms. · Fat and calcium start to build up in your arteries. This buildup is called plaque.  Plaque makes your arteries narrower and stiffer. Blood can't flow through them as easily. · This lack of good blood flow starts to damage some of the organs in your body. This can lead to problems such as coronary artery disease and heart attack, heart failure, stroke, kidney failure, and eye damage. How can you prevent high blood pressure? · Stay at a healthy weight. · Try to limit how much sodium you eat to less than 2,300 milligrams (mg) a day. If you limit your sodium to 1,500 mg a day, you can lower your blood pressure even more. ¨ Buy foods that are labeled \"unsalted,\" \"sodium-free,\" or \"low-sodium. \" Foods labeled \"reduced-sodium\" and \"light sodium\" may still have too much sodium. ¨ Flavor your food with garlic, lemon juice, onion, vinegar, herbs, and spices instead of salt. Do not use soy sauce, steak sauce, onion salt, garlic salt, mustard, or ketchup on your food. ¨ Use less salt (or none) when recipes call for it. You can often use half the salt a recipe calls for without losing flavor. · Be physically active. Get at least 30 minutes of exercise on most days of the week. Walking is a good choice. You also may want to do other activities, such as running, swimming, cycling, or playing tennis or team sports. · Limit alcohol to 2 drinks a day for men and 1 drink a day for women. · Eat plenty of fruits, vegetables, and low-fat dairy products. Eat less saturated and total fats. How is high blood pressure treated? · Your doctor will suggest making lifestyle changes. For example, your doctor may ask you to eat healthy foods, quit smoking, lose extra weight, and be more active. · If lifestyle changes don't help enough or your blood pressure is very high, you will have to take medicine every day. Follow-up care is a key part of your treatment and safety. Be sure to make and go to all appointments, and call your doctor if you are having problems.  It's also a good idea to know your test results and keep a list of the medicines you take.  Where can you learn more? Go to http://dudley-arun.info/. Enter P501 in the search box to learn more about \"Learning About High Blood Pressure. \"  Current as of: March 23, 2016  Content Version: 11.3  © 2496-8023 [employer]. Care instructions adapted under license by [employer] (which disclaims liability or warranty for this information). If you have questions about a medical condition or this instruction, always ask your healthcare professional. [address] any warranty or liability for your use of this information. Uterine Fibroids: Care Instructions  Your Care Instructions    Uterine fibroids are growths in the uterus. Fibroids aren't cancer. Doctors don't know what causes fibroids. Fibroids are very common in women during their childbearing years. Fibroids can grow on the inside of the uterus, in the muscle wall of the uterus, or near the outside wall of the uterus. In some women, fibroids cause painful cramps and heavy periods. In these cases, taking anti-inflammatory medicines, birth control pills, or using an intrauterine device (IUD) often helps decrease symptoms. Sometimes surgery is needed to treat fibroids. But if you are near menopause, you may want to wait and see if your symptoms get better. Most fibroids shrink and go away after menopause, when your menstrual periods stop completely. Follow-up care is a key part of your treatment and safety. Be sure to make and go to all appointments, and call your doctor if you are having problems. It's also a good idea to know your test results and keep a list of the medicines you take. How can you care for yourself at home? · If your doctor gave you medicine, take it as exactly as prescribed. Call your doctor if you think you are having a problem with your medicine. · Take anti-inflammatory medicines for pain. These include ibuprofen (Advil, Motrin) and naproxen (Aleve).  Read and follow all instructions on the label. · Use heat, such as a hot water bottle or a heating pad set on low, or a warm bath to relax tense muscles and relieve cramping. Put a thin cloth between the heating pad and your skin. Never go to sleep with a heating pad on. · Lie down and put a pillow under your knees. Or, lie on your side and bring your knees up to your chest. These positions may help relieve belly pain or pressure. · Keep track of how many sanitary pads or tampons you use each day. · Get at least 30 minutes of exercise on most days of the week. Walking is a good choice. You also may want to do other activities, such as running, swimming, cycling, or playing tennis or team sports. · If you bleed longer than usual or have heavy bleeding, take a daily multivitamin with iron. When should you call for help? Call 911 anytime you think you may need emergency care. For example, call if:  · You passed out (lost consciousness). · You have sudden, severe pain in your belly or pelvis. Call your doctor now or seek immediate medical care if:  · You have severe vaginal bleeding. This means that you are soaking through your usual pads each hour for 2 or more hours. · You have new belly or pelvic pain. · You are dizzy or lightheaded, or you feel like you may faint. · You have new or unexpected vaginal bleeding. Watch closely for changes in your health, and be sure to contact your doctor if:  · You have new vaginal discharge. · You have ongoing heavy or irregular vaginal bleeding. · You have pelvic pain or a heavy feeling in your lower belly that doesn't go away. · You have to urinate often. · You are more constipated than usual.  Where can you learn more? Go to http://dudley-raun.info/. Enter B121 in the search box to learn more about \"Uterine Fibroids: Care Instructions. \"  Current as of: October 13, 2016  Content Version: 11.3  © 8093-7385 [employer], [facility].  Care instructions adapted under license by [employer] (which disclaims liability or warranty for this information). If you have questions about a medical condition or this instruction, always ask your healthcare professional. [address] any warranty or liability for your use of this information.

## 2017-07-09 NOTE — PROGRESS NOTES
07/08/17 2012 07/08/17 2225 07/09/17 0103   Vital Signs   BP (!) 171/102 152/84 (!) 153/91       0130- Pt's BP still elevated. Lisinopril 40 mg to be started tomorrow at 0900 and started lisinopril/HTCZ yesterday + extra dose and a dose tomorrow upcoming. Pt was given a onetime dose of IV hydralazine 10 mg around 9pm and her BP was minimally impacted.  Paged oncever to notify     Mathew Buck- Dr. Silver Mukherjee ordered one time dose of clonidine 0.1 mg PO and recheck BP.    0415- Pressure post treatment is as follows:       07/09/17 0224 07/09/17 0412   Vital Signs   /87 140/77

## 2017-07-09 NOTE — PROGRESS NOTES
I have reviewed discharge instructions with the patient. The patient verbalized understanding. Patient taken to vehicle accompanied by staff member. Prescriptions given to patient to fill at her preferred pharmacy. All belongings sent home with patient. No s/s of distress noted upon discharge.

## 2017-07-09 NOTE — DISCHARGE SUMMARY
Discharge Summary       PATIENT ID: Hayley Montanez  MRN: [id number]   YOB: 1980    DATE OF ADMISSION: 7/6/2017  4:28 PM    DATE OF DISCHARGE: 7/9/17   PRIMARY CARE PROVIDER: new PCP Dr Stephanie Kidd: Shelly Marquez MD, [facility]  DISCHARGING PROVIDER: Shelly Marquez MD, S    To contact this individual call [phone] and ask the  to page. If unavailable ask to be transferred the Adult Hospitalist Department. CONSULTATIONS: IP CONSULT TO HOSPITALIST  IP CONSULT TO OB GYN    PROCEDURES/SURGERIES: * No surgery found *  7/6 b/l LE venous duplex  Blood transfusion  7/6 CT abd/pelvis w contrast    [address] COURSE:   39 yo woman with uterine fibroids s/p myomectomy, HTN, sleep apnea, morbid obesity, and chronic anemia was sent from [facility] on 7/6/17 with Hb 4.4, platelet count 70,957 and bacteriuria.      Acute symptomatic anemia (POA)  - likely due to bleeding uterine fibroids  - FOBT negative in ED  - H/H much improved s/p 5 units PRBC transfusion since admission  - Gyn consulted     Uterine fibroids s/p remote myomectomy  - seen by Gyn on 7/6 and discussed outpatient endometrial bx  - pain control     Morbid obesity, Body mass index is 41.6 kg/(m^2).      LLE > RLE edema (POA) - LLE venous duplex 7/6 negative DVT     HTN, uncontrolled   -s/p multiple doses of Lasix  - started lisinopril/HCTZ but will change to high dose lisinopril alone  - stated she was on amlodipine until about 2 years ago but stopped because BP ws better after weight loss  - did not resume amlodipine due to b/l LE edema  - advised to f/u with new PCP; if she desires pregnancy, she will need to change from lisinopril to another antihypertensive  - BP much improved now but still above goal     Hypokalemia - likely due to multiple doses of Lasix; replete prn    DISCHARGE DIAGNOSES / PLAN:      Stable for discharge home to self-care. Follow up with new PCP and new Gyn. PENDING TEST RESULTS:   At the time of discharge the following test results are still pending: none    FOLLOW UP APPOINTMENTS:    Follow-up Information     Follow up With Details Comments Contact Info    [facility] [address] On 7/26/2017 13:66 am w/ Dr. Boland Nap: New Patient Appointment (Please arrive at 10:15 am to complete documentation) [address] [facility] [address] [place] [address],[facility] 200    Osei Barfield MD  new PCP as scheduled [address] [facility] [address]  [address] [place] [address]  [facility]  Please schedule for your new PCP. [address] [facility] [address]  [phone]           ADDITIONAL CARE RECOMMENDATIONS:   1. Take medications as prescribed. 2. Keep appointment(s) as recommended/scheduled. 3. You have been started on lisinopril for high blood pressure. Please note your blood pressures and show to your doctor. If you desire pregnancy, you may need to be started on a different blood pressure medication. 4. Continue to stay off cigarettes. 5. Try to lose weight. DIET: Cardiac Diet    ACTIVITY: Activity as tolerated    WOUND CARE: none    EQUIPMENT needed: none    DISCHARGE MEDICATIONS:  Current Discharge Medication List      START taking these medications    Details   lisinopril (PRINIVIL, ZESTRIL) 40 mg tablet Take 1 Tab by mouth daily. Indications: hypertension  Qty: 30 Tab, Refills: 0      Blood Pressure Test Kit-Large kit 1 Each by Does Not Apply route daily. Indications: hypertension  Qty: 1 Kit, Refills: 0             NOTIFY YOUR PHYSICIAN FOR ANY OF THE FOLLOWING:   Fever over 101 degrees for 24 hours. Chest pain, shortness of breath, fever, chills, nausea, vomiting, diarrhea, change in mentation, falling, weakness, bleeding. Severe pain or pain not relieved by medications. Or, any other signs or symptoms that you may have questions about.     DISPOSITION:  x  Home With:   OT  PT SLIM  RN       Long term SNF/Inpatient Rehab    Independent/assisted living    Hospice    Other:       PATIENT CONDITION AT DISCHARGE:     Functional status    Poor     Deconditioned    x Independent      Cognition   x  Lucid     Forgetful     Dementia      Catheters/lines (plus indication)    Merchant     PICC     PEG    x None      Code status   x  Full code     DNR      PHYSICAL EXAMINATION AT DISCHARGE:  Visit Vitals    /84 (BP 1 Location: Left arm, BP Patient Position: Sitting)    Pulse 76    Temp 98.5 °F (36.9 °C)    Resp 18    Ht 5' 5\" (1.651 m)    Wt 113.4 kg (250 lb)    SpO2 95%    BMI 41.6 kg/m2     Constitutional:  awake, no acute distress, cooperative, pleasant, obese   ENT:  oral mucosa moist, oropharynx benign  Neck supple, no masses   Resp:  CTA bilaterally, no wheezing/rhonchi/rales   CV:  regular rhythm, normal rate, no m/r/g appreciated, b/l LE nonpitting edema, +pulses    GI:  +BS, soft, non distended, non tender, obese      Musculoskeletal:  moves all extremities    Neurologic:  AAOx3, NFD                                             Skin:  warm, dry  Eyes:  PERRL    Labs  Recent Results (from the past 24 hour(s))   CBC WITH AUTOMATED DIFF    Collection Time: 07/09/17  2:29 AM   Result Value Ref Range    WBC 10.9 3.6 - 11.0 K/uL    RBC 4.28 3.80 - 5.20 M/uL    HGB 8.9 (L) 11.5 - 16.0 g/dL    HCT 29.7 (L) 35.0 - 47.0 %    MCV 69.4 (L) 80.0 - 99.0 FL    MCH 20.8 (L) 26.0 - 34.0 PG    MCHC 30.0 30.0 - 36.5 g/dL    PLATELET 702 545 - 645 K/uL    NEUTROPHILS 68 32 - 75 %    LYMPHOCYTES 19 12 - 49 %    MONOCYTES 7 5 - 13 %    EOSINOPHILS 5 0 - 7 %    BASOPHILS 0 0 - 1 %    MYELOCYTES 1 (H) 0 %    ABS. NEUTROPHILS 7.4 1.8 - 8.0 K/UL    ABS. LYMPHOCYTES 2.1 0.8 - 3.5 K/UL    ABS. MONOCYTES 0.8 0.0 - 1.0 K/UL    ABS. EOSINOPHILS 0.5 (H) 0.0 - 0.4 K/UL    ABS.  BASOPHILS 0.0 0.0 - 0.1 K/UL    DF MANUAL      PLATELET COMMENTS LARGE PLATELETS      RBC COMMENTS ANISOCYTOSIS  1+        RBC COMMENTS MICROCYTOSIS  1+        RBC COMMENTS HYPOCHROMIA  2+        RBC COMMENTS POLYCHROMASIA  1+        RBC COMMENTS OVALOCYTES  PRESENT        RBC COMMENTS RBC FRAGMENTS  Dimorphic RBCs       METABOLIC PANEL, BASIC    Collection Time: 07/09/17  2:29 AM   Result Value Ref Range    Sodium 139 136 - 145 mmol/L    Potassium 3.4 (L) 3.5 - 5.1 mmol/L    Chloride 103 97 - 108 mmol/L    CO2 26 21 - 32 mmol/L    Anion gap 10 5 - 15 mmol/L    Glucose 100 65 - 100 mg/dL    BUN 6 6 - 20 MG/DL    Creatinine 0.79 0.55 - 1.02 MG/DL    BUN/Creatinine ratio 8 (L) 12 - 20      GFR est AA >60 >60 ml/min/1.73m2    GFR est non-AA >60 >60 ml/min/1.73m2    Calcium 9.0 8.5 - 10.1 MG/DL   MAGNESIUM    Collection Time: 07/09/17  2:29 AM   Result Value Ref Range    Magnesium 1.6 1.6 - 2.4 mg/dL   PHOSPHORUS    Collection Time: 07/09/17  2:29 AM   Result Value Ref Range    Phosphorus 3.6 2.6 - 4.7 MG/DL   NUCLEATED RBC    Collection Time: 07/09/17  2:29 AM   Result Value Ref Range    NRBC 1.6 (H) 0  WBC    ABSOLUTE NRBC 0.18 (H) 0.00 - 0.01 K/uL    WBC CORRECTED FOR NR ADJUSTED FOR NUCLEATED RBC'S         CHRONIC MEDICAL DIAGNOSES:  Problem List as of 7/9/2017  Date Reviewed: 7/9/2017          Codes Class Noted - Resolved    Fibroids (Chronic) ICD-10-CM: D25.9  ICD-9-CM: 218.9  7/9/2017 - Present        Morbid obesity (HCC) (Chronic) ICD-10-CM: E66.01  ICD-9-CM: 278.01  7/9/2017 - Present        Hypertension (Chronic) ICD-10-CM: I10  ICD-9-CM: 401.9  7/9/2017 - Present        Generalized abdominal pain ICD-10-CM: R10.84  ICD-9-CM: 789.07  7/6/2017 - Present        RESOLVED: Hypokalemia ICD-10-CM: E87.6  ICD-9-CM: 276.8  7/9/2017 - 7/9/2017        * (Principal)RESOLVED: Symptomatic anemia ICD-10-CM: D64.9  ICD-9-CM: 285.9  7/6/2017 - 7/9/2017        RESOLVED: Bilateral leg edema ICD-10-CM: R60.0  ICD-9-CM: 782.3  7/6/2017 - 7/9/2017              Greater than 30 minutes were spent with the patient on counseling and coordination of care.    Signed:   Juanpablo Pat MD  7/9/2017  1:20 PM

## 2017-07-26 ENCOUNTER — OFFICE VISIT (OUTPATIENT)
Dept: OBGYN CLINIC | Age: 37
End: 2017-07-26

## 2017-07-26 VITALS — SYSTOLIC BLOOD PRESSURE: 138 MMHG | DIASTOLIC BLOOD PRESSURE: 80 MMHG

## 2017-07-26 DIAGNOSIS — D25.9 UTERINE LEIOMYOMA, UNSPECIFIED LOCATION: Primary | ICD-10-CM

## 2017-07-26 NOTE — PROGRESS NOTES
Aparna Pablo is a 40 y.o. female who complains of  Fibroids. History of myomectomy 2006 with Dr. Shawn Ortiz, was though to have 2 fibroids but when but during surgery had 12 removed. . Needed incisional  mesh repair post op. Went to [facility] ER 7/6/2017,  HGB was 4.4, had multiple blood transfusions. Came into the ER due to fatigue; she has long standing menorrhagia. Had a CT at that time revealing an enlarged uterus w possible fibroids; wants to maintain childbearing. Hgb on leaving hospital 8.4; on iron supplement    Her current method of family planning is none. The patient is not currently sexually active. She developed this problem approximately 1 year ago; recent significant worsening in symptoms of fatigue and abdominal discomfort  US done today reveals uterus rising above umbilicus with fibroid region measuring >20cm; unable to clearly identify endometrial cavity; similar findings on CT during recent hosp stay    Her relevant past medical history:   Past Medical History:   Diagnosis Date    Anemia     Fibroids     Hypertension     Uterine fibroid         Past Surgical History:   Procedure Laterality Date    HX MYOMECTOMY      HX MYOMECTOMY      INSERT MESH/PELVIC FLR ADDON       Social History     Occupational History    Not on file. Social History Main Topics    Smoking status: Former Smoker     Quit date: 4/6/2017    Smokeless tobacco: Never Used    Alcohol use No      Comment: \"not really\"    Drug use: Yes     Special: Marijuana    Sexual activity: No     Family History   Problem Relation Age of Onset    Cancer Mother     Hypertension Mother     Hypertension Father        No Known Allergies  Prior to Admission medications    Medication Sig Start Date End Date Taking? Authorizing Provider   lisinopril (PRINIVIL, ZESTRIL) 40 mg tablet Take 1 Tab by mouth daily.  Indications: hypertension 7/9/17  Yes Farhat Mcclendon MD   Blood Pressure Test Kit-Large kit 1 Each by Does Not Apply route daily.  Indications: hypertension 7/9/17   Margaret Avery MD        Review of Systems - History obtained from the patient  Constitutional: negative for weight loss, fever, night sweats  HEENT: negative for hearing loss, earache, congestion, snoring, sorethroat  CV: negative for chest pain, palpitations, edema  Resp: negative for cough, shortness of breath, wheezing  Breast: negative for breast lumps, nipple discharge, galactorrhea  GI: negative for change in bowel habits, abdominal pain, black or bloody stools  : negative for frequency, dysuria, hematuria  MSK: negative for back pain, joint pain, muscle pain  Skin: negative for itching, rash, hives  Neuro: negative for dizziness, headache, confusion, weakness  Psych: negative for anxiety, depression, change in mood  Heme/lymph: negative for bleeding, bruising, pallor      Objective:  Visit Vitals    /80    LMP 04/10/2017 (Within Weeks)          PHYSICAL EXAMINATION    Constitutional  · Appearance: well-nourished, well developed, alert, in no acute distress    HENT  · Head and Face: appears normal    Neck  · Inspection/Palpation: normal appearance, no masses or tenderness  · Lymph Nodes: no lymphadenopathy present  · Thyroid: gland size normal, nontender, no nodules or masses present on palpation  ·   Breasts  · Inspection of Breasts: breasts symmetrical, no skin changes, no discharge present, nipple appearance normal, no skin retraction present  · Palpation of Breasts and Axillae: no masses present on palpation, no breast tenderness  · Axillary Lymph Nodes: no lymphadenopathy present    Gastrointestinal  · Abdominal Examination: abdomen non-tender to palpation, normal bowel sounds, pelvic mass filling lower abdomen and extending 7-4JU above umbilicus  · Liver and spleen: no hepatomegaly present, spleen not palpable  · Hernias: no hernias identified    Genitourinary  · External Genitalia: normal appearance for age, no discharge present, no tenderness present, no inflammatory lesions present, no masses present, no atrophy present  · Vagina: normal vaginal vault without central or paravaginal defects, no discharge present, no inflammatory lesions present, no masses present  · Bladder: non-tender to palpation  · Urethra: appears normal  · Cervix: normal   · Uterus: 26-28wk size uterus; irregular  · Adnexa: no adnexal tenderness present, no adnexal masses present  · Perineum: perineum within normal limits, no evidence of trauma, no rashes or skin lesions present  · Anus: anus within normal limits, no hemorrhoids present  · Inguinal Lymph Nodes: no lymphadenopathy present    Skin  · General Inspection: no rash, no lesions identified    Neurologic/Psychiatric  · Mental Status:  · Orientation: grossly oriented to person, place and time  · Mood and Affect: mood normal, affect appropriate    Assessment:   Large fibroid uterus   Severe anemia with hgb 4.4; now s/p blood transfusion; current hgb 8.4  Plan:   Due to overall size of uterus, anemia and concern of possible malignancy, will refer to gyn/onc for evaluation; appt made for this week  Patient agreeable to plan  Remain on iron supplement

## 2017-07-27 ENCOUNTER — OFFICE VISIT (OUTPATIENT)
Dept: GYNECOLOGY | Age: 37
End: 2017-07-27

## 2017-07-27 VITALS
HEART RATE: 68 BPM | DIASTOLIC BLOOD PRESSURE: 95 MMHG | BODY MASS INDEX: 37.99 KG/M2 | WEIGHT: 228 LBS | HEIGHT: 65 IN | SYSTOLIC BLOOD PRESSURE: 146 MMHG

## 2017-07-27 DIAGNOSIS — R10.84 GENERALIZED ABDOMINAL PAIN: ICD-10-CM

## 2017-07-27 DIAGNOSIS — E66.01 MORBID OBESITY, UNSPECIFIED OBESITY TYPE (HCC): Chronic | ICD-10-CM

## 2017-07-27 DIAGNOSIS — D25.1 INTRAMURAL LEIOMYOMA OF UTERUS: Primary | Chronic | ICD-10-CM

## 2017-07-27 DIAGNOSIS — D50.0 IRON DEFICIENCY ANEMIA DUE TO CHRONIC BLOOD LOSS: ICD-10-CM

## 2017-07-27 RX ORDER — LANOLIN ALCOHOL/MO/W.PET/CERES
325 CREAM (GRAM) TOPICAL
COMMUNITY

## 2017-07-27 NOTE — PROGRESS NOTES
New patient referred by Helena Vazquez for fibroids. Pt's blood pressure elevated. Pt is under the care of her pcp for her hypertension and they have ordered her a bp monitor for home.

## 2017-07-27 NOTE — PROGRESS NOTES
[address] [place] [address] [address], [address] [facility]  [phone]  MD Jie Briseno MD Rosalio Ion, NP    Office Note  Patient ID:  Name:  Se Tapia  MRN:  [id number]  :  1980/37 y.o. Date:  2017      HISTORY OF PRESENT ILLNESS:  Se Tapia is a 40 y.o.  premenopausal female who is being seen for symptomatic uterine fibroids. She is referred by Dr. Candis Jade. She was recently hospitalized for severe anemia and was transfused 2 units PRBCs. She has a history of myomectomy in . Following that procedure the her symptoms improved for a few years, but since about  they have become progressively worse. In addition to having transfusions, she has also had multiple iron transfusions in the past.  She has heavy periods and reports significant abdominal pain. I have been asked to see her in consultation for further evaluation and management. She has not had children and wishes she could, but she is very realistic about it. She has been told by numerous gynecologists over the last 10 years that she needs a hysterectomy. She has now accepted that fact. ROS:   and GI review: Negative  Cardiopulmonary review:Negative   Musculoskeletal:  Negative    A comprehensive review of systems was negative except for that written in the History of Present Illness.  , 10 point ROS    OB/GYN ROS:    Hx of myomectomy in       Problem List:  Patient Active Problem List    Diagnosis Date Noted    Iron deficiency anemia due to chronic blood loss 2017    Fibroids 2017    Morbid obesity ([address] 2017    Hypertension 2017    Generalized abdominal pain 2017     PMH:  Past Medical History:   Diagnosis Date    Anemia     Fibroids     Hypertension     Uterine fibroid       PSH:  Past Surgical History:   Procedure Laterality Date    HX MYOMECTOMY      HX MYOMECTOMY      INSERT MESH/PELVIC FLR ADDON        Social History:  Social History   Substance Use Topics    Smoking status: Former Smoker     Quit date: 4/6/2017    Smokeless tobacco: Never Used    Alcohol use No      Comment: \"not really\"      Family History:  Family History   Problem Relation Age of Onset    Cancer Mother     Hypertension Mother     Hypertension Father       Medications: (reviewed)  Current Outpatient Prescriptions   Medication Sig    ferrous sulfate (IRON) 325 mg (65 mg iron) tablet Take  by mouth Daily (before breakfast).  lisinopril (PRINIVIL, ZESTRIL) 40 mg tablet Take 1 Tab by mouth daily. Indications: hypertension     No current facility-administered medications for this visit. Allergies: (reviewed)  No Known Allergies       OBJECTIVE:    Physical Exam:  VITAL SIGNS: Vitals:    07/27/17 0837 07/27/17 0842   BP: (!) 148/96 (!) 146/95   Pulse: 68    Weight: 103.4 kg (228 lb)    Height: 5' 5\" (1.651 m)      Body mass index is 37.94 kg/(m^2). GENERAL DENISE: Conversant, alert, oriented. No acute distress. HEENT: HEENT. No thyroid enlargement. No JVD. Neck: Supple without restrictions. RESPIRATORY: Clear to auscultation and percussion to the bases. No CVAT. CARDIOVASC: RRR without murmur/rub. GASTROINT: soft, non-tender; mass palpable above the level of the umbilicus   MUSCULOSKEL: no joint tenderness, deformity or swelling   EXTREMITIES: extremities normal, atraumatic, no cyanosis or edema   PELVIC: Normal appearing external genitalia. Normal vagina. Normal cervix. Uterus markedly enlarged with numerous fibroids. It was minimally mobile. RECTAL: Deferred   MIRAN SURVEY: No suspicious lymphadenopathy or edema noted. NEURO: Grossly intact. No acute deficit.          Lab Results   Component Value Date/Time    WBC 10.9 07/09/2017 02:29 AM    HGB 8.9 07/09/2017 02:29 AM    HCT 29.7 07/09/2017 02:29 AM    PLATELET 488 67/93/5037 02:29 AM    MCV 69.4 07/09/2017 02:29 AM     Lab Results   Component Value Date/Time    Sodium 139 07/09/2017 02:29 AM    Potassium 3.4 07/09/2017 02:29 AM    Chloride 103 07/09/2017 02:29 AM    CO2 26 07/09/2017 02:29 AM    Anion gap 10 07/09/2017 02:29 AM    Glucose 100 07/09/2017 02:29 AM    BUN 6 07/09/2017 02:29 AM    Creatinine 0.79 07/09/2017 02:29 AM    BUN/Creatinine ratio 8 07/09/2017 02:29 AM    GFR est AA >60 07/09/2017 02:29 AM    GFR est non-AA >60 07/09/2017 02:29 AM    Calcium 9.0 07/09/2017 02:29 AM       CT of abdomen/pelvis (7/6/17)  Imaging of the abdomen was performed prior to optimal enhancement of the liver. The hepatic veins are not enhanced and the parenchyma is incompletely enhanced. Liver: The liver is normal in size and contour with no focal abnormality. Gallbladder and bile ducts: There is a calcified stone in the gallbladder. Spleen: No abnormality. Pancreas: No abnormality. Adrenal glands: No abnormality. Kidneys: No abnormality.     PELVIS:  Reproductive organs: The uterus is enlarged and nodular in contour measuring  15.1 x 14 x 19 cm. There is a suggestion of a large endometrial mass with  slightly increased attenuation. The ovaries are not visualized. Bladder: No abnormality.      BOWEL AND MESENTERY: The small bowel is normal.  There is no mesenteric mass or  adenopathy. The appendix is normal.     PERITONEUM: There is no ascites or free intraperitoneal air.     RETROPERITONEUM: The aorta  tapers without aneurysm. There is no retroperitoneal  adenopathy or mass. There is no pelvic mass or adenopathy.     BONES AND SOFT TISSUES: There is diffuse body edema. The bones are within normal  limits.     IMPRESSION  IMPRESSION:   1. Markedly enlarged uterus . This could all represent fibroids, however  neoplasm is not excluded. 2. Pericardial effusion. 3. Diffuse body edema. 4. Gallstone.       IMPRESSION/PLAN:  Phoenix Ramirez is a 40 y.o. female with a working diagnosis of symptomatic uterine fibroids, producing pain and menorrhagia with anemia. I reviewed with Genoveva Diaz her medical records, physical exam, and review of symptoms. I reviewed the CT images with her. I don't think she is a good candidate for a repeat myomectomy. Even with a successful myomectomy, I think her chances of actually getting pregnant afterwards are very unlikely. I have recommended abdominal hysterectomy with ovarian preservation if possible. She is likely to have extensive adhesions from her prior procedure. She is not a candidate for a minimally invasive procedure. She was counseled on the risks, benefits, indications, and alternatives of surgery. Her questions were answered and she wishes to proceed.       Signed By: Rito Macedo., MD     7/27/2017/8:38 AM

## 2017-07-27 NOTE — LETTER
2017 9:40 AM 
 
Patient:  Nimisha Archuleta YOB: 1980 Date of Visit: 2017 Dear Brenda Valentine MD 
[address] [facility] [address] [facility] [address] VIA In Basket 
 : Thank you for referring Ms. Nimisha Archuleta to me for evaluation/treatment. Below are the relevant portions of my assessment and plan of care. [address] [facility], [address] [place] 
[phone] MD Evens Wild MD Marlan Sima, BRIAN Office Note Patient ID: 
Name:  Nimisha Archuleta MRN:  [id number] :  1980/37 y.o. Date:  2017 HISTORY OF PRESENT ILLNESS: 
Nimisha Archuleta is a 40 y.o.  premenopausal female who is being seen for symptomatic uterine fibroids. She is referred by Dr. Smita Heaton. She was recently hospitalized for severe anemia and was transfused 2 units PRBCs. She has a history of myomectomy in . Following that procedure the her symptoms improved for a few years, but since about  they have become progressively worse. In addition to having transfusions, she has also had multiple iron transfusions in the past.  She has heavy periods and reports significant abdominal pain. I have been asked to see her in consultation for further evaluation and management. She has not had children and wishes she could, but she is very realistic about it. She has been told by numerous gynecologists over the last 10 years that she needs a hysterectomy. She has now accepted that fact. ROS: 
 and GI review: Negative Cardiopulmonary review:Negative Musculoskeletal:  Negative A comprehensive review of systems was negative except for that written in the History of Present Illness. , 10 point ROS 
 
OB/GYN ROS: 
 Hx of myomectomy in  Problem List: 
Patient Active Problem List  
 Diagnosis Date Noted  Iron deficiency anemia due to chronic blood loss 07/27/2017  Fibroids 07/09/2017  Morbid obesity ([facility] [address] 07/09/2017  Hypertension 07/09/2017  Generalized abdominal pain 07/06/2017 PMH: 
Past Medical History:  
Diagnosis Date  Anemia  Fibroids  Hypertension  Uterine fibroid PSH: 
Past Surgical History:  
Procedure Laterality Date  HX MYOMECTOMY  HX MYOMECTOMY  INSERT MESH/PELVIC FLR ADDON Social History: 
Social History Substance Use Topics  Smoking status: Former Smoker Quit date: 4/6/2017  Smokeless tobacco: Never Used  Alcohol use No  
   Comment: \"not really\" Family History: 
Family History Problem Relation Age of Onset  Cancer Mother  Hypertension Mother  Hypertension Father Medications: (reviewed) Current Outpatient Prescriptions Medication Sig  
 ferrous sulfate (IRON) 325 mg (65 mg iron) tablet Take  by mouth Daily (before breakfast).  lisinopril (PRINIVIL, ZESTRIL) 40 mg tablet Take 1 Tab by mouth daily. Indications: hypertension No current facility-administered medications for this visit. Allergies: (reviewed) No Known Allergies OBJECTIVE: 
 
Physical Exam: VITAL SIGNS: Vitals:  
 07/27/17 8797 07/27/17 6102 BP: (!) 148/96 (!) 146/95 Pulse: 68 Weight: 103.4 kg (228 lb) Height: 5' 5\" (1.651 m) Body mass index is 37.94 kg/(m^2). GENERAL DENISE: Conversant, alert, oriented. No acute distress. HEENT: HEENT. No thyroid enlargement. No JVD. Neck: Supple without restrictions. RESPIRATORY: Clear to auscultation and percussion to the bases. No CVAT. CARDIOVASC: RRR without murmur/rub. GASTROINT: soft, non-tender; mass palpable above the level of the umbilicus MUSCULOSKEL: no joint tenderness, deformity or swelling EXTREMITIES: extremities normal, atraumatic, no cyanosis or edema PELVIC: Normal appearing external genitalia. Normal vagina.   Normal cervix. Uterus markedly enlarged with numerous fibroids. It was minimally mobile. RECTAL: Deferred MIRNA SURVEY: No suspicious lymphadenopathy or edema noted. NEURO: Grossly intact. No acute deficit. Lab Results Component Value Date/Time WBC 10.9 07/09/2017 02:29 AM  
 HGB 8.9 07/09/2017 02:29 AM  
 HCT 29.7 07/09/2017 02:29 AM  
 PLATELET 058 61/90/3197 02:29 AM  
 MCV 69.4 07/09/2017 02:29 AM  
 
Lab Results Component Value Date/Time Sodium 139 07/09/2017 02:29 AM  
 Potassium 3.4 07/09/2017 02:29 AM  
 Chloride 103 07/09/2017 02:29 AM  
 CO2 26 07/09/2017 02:29 AM  
 Anion gap 10 07/09/2017 02:29 AM  
 Glucose 100 07/09/2017 02:29 AM  
 BUN 6 07/09/2017 02:29 AM  
 Creatinine 0.79 07/09/2017 02:29 AM  
 BUN/Creatinine ratio 8 07/09/2017 02:29 AM  
 GFR est AA >60 07/09/2017 02:29 AM  
 GFR est non-AA >60 07/09/2017 02:29 AM  
 Calcium 9.0 07/09/2017 02:29 AM  
 
 
CT of abdomen/pelvis (7/6/17) Imaging of the abdomen was performed prior to optimal enhancement of the liver. The hepatic veins are not enhanced and the parenchyma is incompletely enhanced. Liver: The liver is normal in size and contour with no focal abnormality. Gallbladder and bile ducts: There is a calcified stone in the gallbladder. Spleen: No abnormality. Pancreas: No abnormality. Adrenal glands: No abnormality. Kidneys: No abnormality. 
  
PELVIS: 
Reproductive organs: The uterus is enlarged and nodular in contour measuring 15.1 x 14 x 19 cm. There is a suggestion of a large endometrial mass with 
slightly increased attenuation. The ovaries are not visualized. Bladder: No abnormality.  
  
BOWEL AND MESENTERY: The small bowel is normal.  There is no mesenteric mass or 
adenopathy. The appendix is normal. 
  
PERITONEUM: There is no ascites or free intraperitoneal air. 
  
RETROPERITONEUM: The aorta  tapers without aneurysm.  There is no retroperitoneal 
 adenopathy or mass. There is no pelvic mass or adenopathy. 
  
BONES AND SOFT TISSUES: There is diffuse body edema. The bones are within normal 
limits. 
  
IMPRESSION IMPRESSION:  
1. Markedly enlarged uterus . This could all represent fibroids, however 
neoplasm is not excluded. 2. Pericardial effusion. 3. Diffuse body edema. 4. Gallstone. IMPRESSION/PLAN: 
Kory Norwood is a 40 y.o. female with a working diagnosis of symptomatic uterine fibroids, producing pain and menorrhagia with anemia. I reviewed with Kory Norwood her medical records, physical exam, and review of symptoms. I reviewed the CT images with her. I don't think she is a good candidate for a repeat myomectomy. Even with a successful myomectomy, I think her chances of actually getting pregnant afterwards are very unlikely. I have recommended abdominal hysterectomy with ovarian preservation if possible. She is likely to have extensive adhesions from her prior procedure. She is not a candidate for a minimally invasive procedure. She was counseled on the risks, benefits, indications, and alternatives of surgery. Her questions were answered and she wishes to proceed. Signed By: Gary Atkinson MD   
 7/27/2017/8:38 AM  
 
 
New patient referred by Channing Lefort for fibroids. Pt's blood pressure elevated. Pt is under the care of her pcp for her hypertension and they have ordered her a bp monitor for home. If you have questions, please do not hesitate to call me. I look forward to following Ms. Karimi along with you.  
 
 
 
Sincerely, 
 
 
Gary Atkinson MD

## 2017-08-14 ENCOUNTER — TELEPHONE (OUTPATIENT)
Dept: GYNECOLOGY | Age: 37
End: 2017-08-14

## 2017-08-14 NOTE — TELEPHONE ENCOUNTER
Called the pt, she has been changing a super tampon every 30 mins for the past 24 hours. She states she usually bleeds for 5 days with 3 heavy days. After d/w  I advised the pt that we can move her surgery up to next week or this week. She is not sure she will have transportation. She will discuss and monitor the bleeding. Advised if the bleeding does not slow down or stop to call. Also advised to go to the er if bleeding becomes heavier and feels lightheaded or dizziness. The pt is in agreement with this plan.

## 2017-08-21 ENCOUNTER — OFFICE VISIT (OUTPATIENT)
Dept: INTERNAL MEDICINE CLINIC | Age: 37
End: 2017-08-21

## 2017-08-21 VITALS
DIASTOLIC BLOOD PRESSURE: 90 MMHG | OXYGEN SATURATION: 98 % | TEMPERATURE: 97.9 F | SYSTOLIC BLOOD PRESSURE: 150 MMHG | WEIGHT: 227.8 LBS | HEART RATE: 67 BPM | BODY MASS INDEX: 37.95 KG/M2 | RESPIRATION RATE: 16 BRPM | HEIGHT: 65 IN

## 2017-08-21 DIAGNOSIS — D50.0 IRON DEFICIENCY ANEMIA DUE TO CHRONIC BLOOD LOSS: ICD-10-CM

## 2017-08-21 DIAGNOSIS — Z01.810 PREOP CARDIOVASCULAR EXAM: ICD-10-CM

## 2017-08-21 DIAGNOSIS — I10 ESSENTIAL HYPERTENSION: Primary | Chronic | ICD-10-CM

## 2017-08-21 DIAGNOSIS — D25.0 SUBMUCOUS LEIOMYOMA OF UTERUS: Chronic | ICD-10-CM

## 2017-08-21 PROBLEM — G47.33 OSA (OBSTRUCTIVE SLEEP APNEA): Status: ACTIVE | Noted: 2017-08-21

## 2017-08-21 PROBLEM — E66.9 OBESITY (BMI 30-39.9): Status: ACTIVE | Noted: 2017-08-21

## 2017-08-21 PROBLEM — E66.01 MORBID OBESITY (HCC): Chronic | Status: RESOLVED | Noted: 2017-07-09 | Resolved: 2017-08-21

## 2017-08-21 RX ORDER — CHLORTHALIDONE 25 MG/1
25 TABLET ORAL DAILY
Qty: 90 TAB | Refills: 1 | Status: SHIPPED | OUTPATIENT
Start: 2017-08-21

## 2017-08-21 RX ORDER — LISINOPRIL 40 MG/1
40 TABLET ORAL DAILY
Qty: 30 TAB | Refills: 0 | Status: CANCELLED | OUTPATIENT
Start: 2017-08-21

## 2017-08-21 NOTE — PROGRESS NOTES
HISTORY OF PRESENT ILLNESS  Santy Maya is a 40 y.o. female. HPI  Here to establish care. She was hospitalized for severe anemia. She is on iron now and having CHEY next week. No angina or dyspnea. She has preop labs scheduled already. She has HTN on an ACE alone. Past Medical History:   Diagnosis Date    Anemia     Asthma     Fibroids     Hypertension     Uterine fibroid      Current Outpatient Prescriptions   Medication Sig    chlorthalidone (HYGROTEN) 25 mg tablet Take 1 Tab by mouth daily.  ferrous sulfate (IRON) 325 mg (65 mg iron) tablet Take  by mouth Daily (before breakfast). No current facility-administered medications for this visit. Family History   Problem Relation Age of Onset    Cancer Mother      lung    Hypertension Mother     Hypertension Father     Diabetes Father     Heart Disease Father     Stroke Father     Cancer Maternal Grandmother      lung       Review of Systems   All other systems reviewed and are negative. Visit Vitals    /90 (BP 1 Location: Left arm, BP Patient Position: At rest)    Pulse 67    Temp 97.9 °F (36.6 °C) (Oral)    Resp 16    Ht 5' 5\" (1.651 m)    Wt 227 lb 12.8 oz (103.3 kg)    LMP 08/13/2017 (Approximate)    SpO2 98%    BMI 37.91 kg/m2       Physical Exam   Constitutional: She appears well-developed and well-nourished. Cardiovascular: Normal rate, regular rhythm and normal heart sounds. Pulmonary/Chest: Effort normal and breath sounds normal. No respiratory distress. She has no wheezes. She has no rales. Psychiatric: She has a normal mood and affect. Her behavior is normal. Thought content normal.   Nursing note and vitals reviewed. Lab Results   Component Value Date/Time    WBC 10.9 07/09/2017 02:29 AM    HGB 8.9 07/09/2017 02:29 AM    HCT 29.7 07/09/2017 02:29 AM    PLATELET 782 21/68/8655 02:29 AM    MCV 69.4 07/09/2017 02:29 AM       ASSESSMENT and PLAN  Diagnoses and all orders for this visit:    1.  Essential hypertension  -     Start chlorthalidone (HYGROTEN) 25 mg tablet; Take 1 Tab by mouth daily. Stop lisinopril. 2. Submucous leiomyoma of uterus  Concur with plans for CHEY. 3. Iron deficiency anemia due to chronic blood loss  Stay on iron. 4. Preop cardiovascular exam  Cleared for surgery.       Reviewed plan of care with the patient who has provided input and agrees with the goals

## 2017-08-21 NOTE — PROGRESS NOTES
1. Have you been to the ER, urgent care clinic since your last visit?no  Hospitalized since your last visit? Yes [address] 7/6/17-7/9/17  2. Have you seen or consulted any other health care providers outside of the [address] [place] [address] since your last visit? Include any pap smears or colon screening.  No  Chief Complaint   Patient presents with   [address] [place] [address]     Fasting

## 2017-08-21 NOTE — MR AVS SNAPSHOT
Visit Information Date & Time Provider Department Dept. Phone Encounter #  
 8/21/2017  9:30 AM Kai Gaines MD [facility] [phone] [id number] Your Appointments 8/21/2017  9:30 AM  
New Patient with Kai Gaines MD  
[facility] Appt Note: NP  
 [address] [facility] [address]  
[address] [address]  
  
    
 8/28/2017  9:30 AM  
SURGERY with Maximo Graham MD  
[facility] Appt Note: [address] [phone]  
[place] [phone]  
  
    
 8/28/2017  9:30 AM  
ASSISTING SURGERY with Trinidad Mendoza MD  
[facility] Appt Note: [address] [phone]  
[address] Upcoming Health Maintenance Date Due DTaP/Tdap/Td series (1 - Tdap) 5/16/2002 PAP AKA CERVICAL CYTOLOGY 5/16/2002 INFLUENZA AGE 9 TO ADULT 8/1/2017 Allergies as of 8/21/2017  Review Complete On: 8/21/2017 By: Jorje Alcocer No Known Allergies Current Immunizations  Never Reviewed No immunizations on file. Not reviewed this visit You Were Diagnosed With   
  
 Codes Comments Essential hypertension    -  Primary ICD-10-CM: I10 
ICD-9-CM: 401.9 Submucous leiomyoma of uterus     ICD-10-CM: D25.0 ICD-9-CM: 218.0 Iron deficiency anemia due to chronic blood loss     ICD-10-CM: D50.0 ICD-9-CM: 280.0 Preop cardiovascular exam     ICD-10-CM: Z01.810 ICD-9-CM: V72.81 Vitals BP Pulse Temp Resp Height(growth percentile) Weight(growth percentile)  150/90 (BP 1 Location: Left arm, BP Patient Position: At rest) 67 97.9 °F (36.6 °C) (Oral) 16 5' 5\" (1.651 m) 227 lb 12.8 oz (103.3 kg) LMP SpO2 BMI OB Status Smoking Status 08/13/2017 (Approximate) 98% 37.91 kg/m2 Unknown Former Smoker Vitals History BMI and BSA Data Body Mass Index Body Surface Area  
 37.91 kg/m 2 2.18 m 2 Preferred Pharmacy Pharmacy Name Phone [facility] [address] [place] [address], [address] [place] [address],[address] [phone] Your Updated Medication List  
  
   
This list is accurate as of: 8/21/17  9:28 AM.  Always use your most recent med list.  
  
  
  
  
 chlorthalidone 25 mg tablet Commonly known as:  Viola Beverly Take 1 Tab by mouth daily. Iron 325 mg (65 mg iron) tablet Generic drug:  ferrous sulfate Take  by mouth Daily (before breakfast). Prescriptions Sent to Pharmacy Refills  
 chlorthalidone (HYGROTEN) 25 mg tablet 1 Sig: Take 1 Tab by mouth daily. Class: Normal  
 Pharmacy: [address] [place] [address], [address] [place] [address],[address]  #: [phone] Route: Oral  
  
To-Do List   
 08/23/2017 8:00 AM  
  Appointment with [facility] PAT EXAM RM 3 at [address] [facility] ([phone]) Introducing [facility]! [place] introduces [employer] patient portal. Now you can access parts of your medical record, email your doctor's office, and request medication refills online. 1. In your internet browser, go to [email or web address] 2. Click on the First Time User? Click Here link in the Sign In box. You will see the New Member Sign Up page. 3. Enter your Yobble Access Code exactly as it appears below. You will not need to use this code after youve completed the sign-up process. If you do not sign up before the expiration date, you must request a new code. · Yob[employer] Access Code: [email or web address] Expires: 10/7/2017  1:18 PM 
 
4.  Enter the last four digits of your Social Security Number (xxxx) and Date of Birth (mm/dd/yyyy) as indicated and click Submit. You will be taken to the next sign-up page. 5. Create a [employer] ID. This will be your [employer] login ID and cannot be changed, so think of one that is secure and easy to remember. 6. Create a [employer] password. You can change your password at any time. 7. Enter your Password Reset Question and Answer. This can be used at a later time if you forget your password. 8. Enter your e-mail address. You will receive e-mail notification when new information is available in [address] 9. Click Sign Up. You can now view and download portions of your medical record. 10. Click the Download Summary menu link to download a portable copy of your medical information. If you have questions, please visit the Frequently Asked Questions section of the [employer] website. Remember, [employer] is NOT to be used for urgent needs. For medical emergencies, dial 911. Now available from your iPhone and Android! Please provide this summary of care documentation to your next provider. Your primary care clinician is listed as NONE. If you have any questions after today's visit, please call [phone].

## 2017-08-22 ENCOUNTER — TELEPHONE (OUTPATIENT)
Dept: GYNECOLOGY | Age: 37
End: 2017-08-22

## 2017-08-22 NOTE — TELEPHONE ENCOUNTER
The pt states the company never received the updated form. I advised her that we received confirmation that it was received. She will make them aware and I advised her that I will fax that again today.

## 2017-08-23 ENCOUNTER — HOSPITAL ENCOUNTER (OUTPATIENT)
Dept: PREADMISSION TESTING | Age: 37
Discharge: HOME | End: 2017-08-23
Payer: COMMERCIAL

## 2017-08-23 VITALS
HEIGHT: 65 IN | BODY MASS INDEX: 37.72 KG/M2 | SYSTOLIC BLOOD PRESSURE: 118 MMHG | HEART RATE: 65 BPM | WEIGHT: 226.38 LBS | DIASTOLIC BLOOD PRESSURE: 76 MMHG | TEMPERATURE: 97.7 F

## 2017-08-23 LAB
ABO + RH BLD: NORMAL
ALBUMIN SERPL-MCNC: 3.6 G/DL (ref 3.5–5)
ALBUMIN/GLOB SERPL: 1 {RATIO} (ref 1.1–2.2)
ALP SERPL-CCNC: 59 U/L (ref 45–117)
ALT SERPL-CCNC: 21 U/L (ref 12–78)
ANION GAP SERPL CALC-SCNC: 8 MMOL/L (ref 5–15)
AST SERPL-CCNC: 12 U/L (ref 15–37)
ATRIAL RATE: 61 BPM
BASOPHILS # BLD: 0.1 K/UL (ref 0–0.1)
BASOPHILS NFR BLD: 2 % (ref 0–1)
BILIRUB SERPL-MCNC: 0.8 MG/DL (ref 0.2–1)
BLOOD GROUP ANTIBODIES SERPL: NORMAL
BUN SERPL-MCNC: 12 MG/DL (ref 6–20)
BUN/CREAT SERPL: 16 (ref 12–20)
CALCIUM SERPL-MCNC: 8.6 MG/DL (ref 8.5–10.1)
CALCULATED P AXIS, ECG09: 40 DEGREES
CALCULATED R AXIS, ECG10: 9 DEGREES
CALCULATED T AXIS, ECG11: 38 DEGREES
CHLORIDE SERPL-SCNC: 103 MMOL/L (ref 97–108)
CO2 SERPL-SCNC: 26 MMOL/L (ref 21–32)
CREAT SERPL-MCNC: 0.76 MG/DL (ref 0.55–1.02)
DIAGNOSIS, 93000: NORMAL
DIFFERENTIAL METHOD BLD: ABNORMAL
EOSINOPHIL # BLD: 0.2 K/UL (ref 0–0.4)
EOSINOPHIL NFR BLD: 5 % (ref 0–7)
ERYTHROCYTE [DISTWIDTH] IN BLOOD BY AUTOMATED COUNT: 24.3 % (ref 11.5–14.5)
EST. AVERAGE GLUCOSE BLD GHB EST-MCNC: 105 MG/DL
GLOBULIN SER CALC-MCNC: 3.6 G/DL (ref 2–4)
GLUCOSE SERPL-MCNC: 92 MG/DL (ref 65–100)
HBA1C MFR BLD: 5.3 % (ref 4.2–6.3)
HCT VFR BLD AUTO: 28.7 % (ref 35–47)
HGB BLD-MCNC: 8.3 G/DL (ref 11.5–16)
LYMPHOCYTES # BLD: 1.4 K/UL (ref 0.8–3.5)
LYMPHOCYTES NFR BLD: 29 % (ref 12–49)
MCH RBC QN AUTO: 20.2 PG (ref 26–34)
MCHC RBC AUTO-ENTMCNC: 28.9 G/DL (ref 30–36.5)
MCV RBC AUTO: 70 FL (ref 80–99)
MONOCYTES # BLD: 0.3 K/UL (ref 0–1)
MONOCYTES NFR BLD: 6 % (ref 5–13)
NEUTS SEG # BLD: 2.7 K/UL (ref 1.8–8)
NEUTS SEG NFR BLD: 58 % (ref 32–75)
P-R INTERVAL, ECG05: 164 MS
PLATELET # BLD AUTO: 210 K/UL (ref 150–400)
PLATELET COMMENTS,PCOM: ABNORMAL
POTASSIUM SERPL-SCNC: 4.1 MMOL/L (ref 3.5–5.1)
PROT SERPL-MCNC: 7.2 G/DL (ref 6.4–8.2)
Q-T INTERVAL, ECG07: 430 MS
QRS DURATION, ECG06: 78 MS
QTC CALCULATION (BEZET), ECG08: 432 MS
RBC # BLD AUTO: 4.1 M/UL (ref 3.8–5.2)
RBC MORPH BLD: ABNORMAL
SODIUM SERPL-SCNC: 137 MMOL/L (ref 136–145)
SPECIMEN EXP DATE BLD: NORMAL
VENTRICULAR RATE, ECG03: 61 BPM
WBC # BLD AUTO: 4.7 K/UL (ref 3.6–11)

## 2017-08-23 PROCEDURE — 36415 COLL VENOUS BLD VENIPUNCTURE: CPT | Performed by: OBSTETRICS & GYNECOLOGY

## 2017-08-23 PROCEDURE — 86900 BLOOD TYPING SEROLOGIC ABO: CPT | Performed by: OBSTETRICS & GYNECOLOGY

## 2017-08-23 PROCEDURE — 83036 HEMOGLOBIN GLYCOSYLATED A1C: CPT | Performed by: OBSTETRICS & GYNECOLOGY

## 2017-08-23 PROCEDURE — 93005 ELECTROCARDIOGRAM TRACING: CPT

## 2017-08-23 PROCEDURE — 80053 COMPREHEN METABOLIC PANEL: CPT | Performed by: OBSTETRICS & GYNECOLOGY

## 2017-08-23 PROCEDURE — 85025 COMPLETE CBC W/AUTO DIFF WBC: CPT | Performed by: OBSTETRICS & GYNECOLOGY

## 2017-08-23 RX ORDER — LISINOPRIL 40 MG/1
40 TABLET ORAL DAILY
COMMUNITY

## 2017-08-23 NOTE — PERIOP NOTES
PT/FAMILY PROVIDED AND REVIEWED PRE-OP INSTRUCTION SHEET. PATIENT GIVEN SURGICAL SITE INFORMATION FAQS INFORMATION HANDOUT. PT PROVIDED WITH GOOD HAND HYGIENE TIPS. PT GIVEN OPPORTUNITY TO ASK QUESTIONS.   PATIENT PROVIDED WITH TORO WIPES, ORAL AND WRITTEN INSTRUCTIONS

## 2017-08-24 ENCOUNTER — ANESTHESIA EVENT (OUTPATIENT)
Dept: SURGERY | Age: 37
DRG: 743 | End: 2017-08-24
Payer: COMMERCIAL

## 2017-08-28 ENCOUNTER — HOSPITAL ENCOUNTER (INPATIENT)
Age: 37
LOS: 4 days | Discharge: HOME | DRG: 743 | End: 2017-09-01
Attending: OBSTETRICS & GYNECOLOGY | Admitting: OBSTETRICS & GYNECOLOGY
Payer: COMMERCIAL

## 2017-08-28 ENCOUNTER — ANESTHESIA (OUTPATIENT)
Dept: SURGERY | Age: 37
DRG: 743 | End: 2017-08-28
Payer: COMMERCIAL

## 2017-08-28 PROBLEM — K43.9 VENTRAL HERNIA: Status: ACTIVE | Noted: 2017-08-28

## 2017-08-28 PROBLEM — D25.1 INTRAMURAL LEIOMYOMA OF UTERUS: Status: ACTIVE | Noted: 2017-08-28

## 2017-08-28 LAB — HCG UR QL: NEGATIVE

## 2017-08-28 PROCEDURE — 76010000131 HC OR TIME 2 TO 2.5 HR: Performed by: OBSTETRICS & GYNECOLOGY

## 2017-08-28 PROCEDURE — 81025 URINE PREGNANCY TEST: CPT

## 2017-08-28 PROCEDURE — 77030019908 HC STETH ESOPH SIMS -A: Performed by: ANESTHESIOLOGY

## 2017-08-28 PROCEDURE — 0UT70ZZ RESECTION OF BILATERAL FALLOPIAN TUBES, OPEN APPROACH: ICD-10-PCS | Performed by: OBSTETRICS & GYNECOLOGY

## 2017-08-28 PROCEDURE — 77030012879 HC MSK CPAP FLL FAC PHIL -B

## 2017-08-28 PROCEDURE — 0WQF0ZZ REPAIR ABDOMINAL WALL, OPEN APPROACH: ICD-10-PCS | Performed by: OBSTETRICS & GYNECOLOGY

## 2017-08-28 PROCEDURE — 74011250636 HC RX REV CODE- 250/636: Performed by: OBSTETRICS & GYNECOLOGY

## 2017-08-28 PROCEDURE — 86923 COMPATIBILITY TEST ELECTRIC: CPT | Performed by: OBSTETRICS & GYNECOLOGY

## 2017-08-28 PROCEDURE — 77030011640 HC PAD GRND REM COVD -A: Performed by: OBSTETRICS & GYNECOLOGY

## 2017-08-28 PROCEDURE — 0UT90ZZ RESECTION OF UTERUS, OPEN APPROACH: ICD-10-PCS | Performed by: OBSTETRICS & GYNECOLOGY

## 2017-08-28 PROCEDURE — 76060000035 HC ANESTHESIA 2 TO 2.5 HR: Performed by: OBSTETRICS & GYNECOLOGY

## 2017-08-28 PROCEDURE — 74011000250 HC RX REV CODE- 250: Performed by: OBSTETRICS & GYNECOLOGY

## 2017-08-28 PROCEDURE — 88302 TISSUE EXAM BY PATHOLOGIST: CPT | Performed by: OBSTETRICS & GYNECOLOGY

## 2017-08-28 PROCEDURE — 74011000258 HC RX REV CODE- 258: Performed by: OBSTETRICS & GYNECOLOGY

## 2017-08-28 PROCEDURE — 77030018846 HC SOL IRR STRL H20 ICUM -A: Performed by: OBSTETRICS & GYNECOLOGY

## 2017-08-28 PROCEDURE — 77030011264 HC ELECTRD BLD EXT COVD -A: Performed by: OBSTETRICS & GYNECOLOGY

## 2017-08-28 PROCEDURE — 77030008684 HC TU ET CUF COVD -B: Performed by: ANESTHESIOLOGY

## 2017-08-28 PROCEDURE — 0UT10ZZ RESECTION OF LEFT OVARY, OPEN APPROACH: ICD-10-PCS | Performed by: OBSTETRICS & GYNECOLOGY

## 2017-08-28 PROCEDURE — 74011250636 HC RX REV CODE- 250/636

## 2017-08-28 PROCEDURE — P9045 ALBUMIN (HUMAN), 5%, 250 ML: HCPCS

## 2017-08-28 PROCEDURE — 74011000250 HC RX REV CODE- 250

## 2017-08-28 PROCEDURE — 74011250636 HC RX REV CODE- 250/636: Performed by: ANESTHESIOLOGY

## 2017-08-28 PROCEDURE — 88307 TISSUE EXAM BY PATHOLOGIST: CPT | Performed by: OBSTETRICS & GYNECOLOGY

## 2017-08-28 PROCEDURE — 77030026438 HC STYL ET INTUB CARD -A: Performed by: ANESTHESIOLOGY

## 2017-08-28 PROCEDURE — 77030012935 HC DRSG AQUACEL BMS -B: Performed by: OBSTETRICS & GYNECOLOGY

## 2017-08-28 PROCEDURE — 77030031139 HC SUT VCRL2 J&J -A: Performed by: OBSTETRICS & GYNECOLOGY

## 2017-08-28 PROCEDURE — 77030018836 HC SOL IRR NACL ICUM -A: Performed by: OBSTETRICS & GYNECOLOGY

## 2017-08-28 PROCEDURE — 65210000002 HC RM PRIVATE GYN

## 2017-08-28 PROCEDURE — 77030008467 HC STPLR SKN COVD -B: Performed by: OBSTETRICS & GYNECOLOGY

## 2017-08-28 PROCEDURE — 77030013079 HC BLNKT BAIR HGGR 3M -A: Performed by: ANESTHESIOLOGY

## 2017-08-28 PROCEDURE — 76210000017 HC OR PH I REC 1.5 TO 2 HR: Performed by: OBSTETRICS & GYNECOLOGY

## 2017-08-28 PROCEDURE — 86900 BLOOD TYPING SEROLOGIC ABO: CPT | Performed by: OBSTETRICS & GYNECOLOGY

## 2017-08-28 PROCEDURE — 77030032490 HC SLV COMPR SCD KNE COVD -B: Performed by: OBSTETRICS & GYNECOLOGY

## 2017-08-28 PROCEDURE — 77030008771 HC TU NG SALEM SUMP -A: Performed by: ANESTHESIOLOGY

## 2017-08-28 PROCEDURE — 77030026102 HC DEV TISS ENSEAL G2 J&J -F: Performed by: OBSTETRICS & GYNECOLOGY

## 2017-08-28 PROCEDURE — 77030002966 HC SUT PDS J&J -A: Performed by: OBSTETRICS & GYNECOLOGY

## 2017-08-28 PROCEDURE — 77030005518 HC CATH URETH FOL 2W BARD -B: Performed by: OBSTETRICS & GYNECOLOGY

## 2017-08-28 PROCEDURE — 0UTC0ZZ RESECTION OF CERVIX, OPEN APPROACH: ICD-10-PCS | Performed by: OBSTETRICS & GYNECOLOGY

## 2017-08-28 PROCEDURE — 36415 COLL VENOUS BLD VENIPUNCTURE: CPT | Performed by: OBSTETRICS & GYNECOLOGY

## 2017-08-28 RX ORDER — SODIUM CHLORIDE, SODIUM LACTATE, POTASSIUM CHLORIDE, CALCIUM CHLORIDE 600; 310; 30; 20 MG/100ML; MG/100ML; MG/100ML; MG/100ML
125 INJECTION, SOLUTION INTRAVENOUS CONTINUOUS
Status: DISCONTINUED | OUTPATIENT
Start: 2017-08-28 | End: 2017-08-28 | Stop reason: HOSPADM

## 2017-08-28 RX ORDER — SODIUM CHLORIDE 0.9 % (FLUSH) 0.9 %
5-10 SYRINGE (ML) INJECTION AS NEEDED
Status: DISCONTINUED | OUTPATIENT
Start: 2017-08-28 | End: 2017-09-01 | Stop reason: HOSPADM

## 2017-08-28 RX ORDER — LIDOCAINE HYDROCHLORIDE 10 MG/ML
0.1 INJECTION, SOLUTION EPIDURAL; INFILTRATION; INTRACAUDAL; PERINEURAL AS NEEDED
Status: DISCONTINUED | OUTPATIENT
Start: 2017-08-28 | End: 2017-08-28 | Stop reason: HOSPADM

## 2017-08-28 RX ORDER — IBUPROFEN 200 MG
1 TABLET ORAL EVERY 24 HOURS
Status: DISCONTINUED | OUTPATIENT
Start: 2017-08-28 | End: 2017-08-28

## 2017-08-28 RX ORDER — ROPIVACAINE HYDROCHLORIDE 5 MG/ML
30 INJECTION, SOLUTION EPIDURAL; INFILTRATION; PERINEURAL ONCE
Status: DISCONTINUED | OUTPATIENT
Start: 2017-08-28 | End: 2017-08-28 | Stop reason: HOSPADM

## 2017-08-28 RX ORDER — SODIUM CHLORIDE 0.9 % (FLUSH) 0.9 %
5-10 SYRINGE (ML) INJECTION AS NEEDED
Status: DISCONTINUED | OUTPATIENT
Start: 2017-08-28 | End: 2017-08-28 | Stop reason: HOSPADM

## 2017-08-28 RX ORDER — FENTANYL CITRATE 50 UG/ML
INJECTION, SOLUTION INTRAMUSCULAR; INTRAVENOUS AS NEEDED
Status: DISCONTINUED | OUTPATIENT
Start: 2017-08-28 | End: 2017-08-28 | Stop reason: HOSPADM

## 2017-08-28 RX ORDER — MIDAZOLAM HYDROCHLORIDE 1 MG/ML
0.5 INJECTION, SOLUTION INTRAMUSCULAR; INTRAVENOUS
Status: DISCONTINUED | OUTPATIENT
Start: 2017-08-28 | End: 2017-08-28 | Stop reason: HOSPADM

## 2017-08-28 RX ORDER — ROCURONIUM BROMIDE 10 MG/ML
INJECTION, SOLUTION INTRAVENOUS AS NEEDED
Status: DISCONTINUED | OUTPATIENT
Start: 2017-08-28 | End: 2017-08-28 | Stop reason: HOSPADM

## 2017-08-28 RX ORDER — ONDANSETRON 2 MG/ML
4 INJECTION INTRAMUSCULAR; INTRAVENOUS AS NEEDED
Status: DISCONTINUED | OUTPATIENT
Start: 2017-08-28 | End: 2017-08-28 | Stop reason: HOSPADM

## 2017-08-28 RX ORDER — DIPHENHYDRAMINE HYDROCHLORIDE 50 MG/ML
12.5 INJECTION, SOLUTION INTRAMUSCULAR; INTRAVENOUS AS NEEDED
Status: DISCONTINUED | OUTPATIENT
Start: 2017-08-28 | End: 2017-08-28 | Stop reason: HOSPADM

## 2017-08-28 RX ORDER — MIDAZOLAM HYDROCHLORIDE 1 MG/ML
INJECTION, SOLUTION INTRAMUSCULAR; INTRAVENOUS AS NEEDED
Status: DISCONTINUED | OUTPATIENT
Start: 2017-08-28 | End: 2017-08-28 | Stop reason: HOSPADM

## 2017-08-28 RX ORDER — SODIUM CHLORIDE, SODIUM LACTATE, POTASSIUM CHLORIDE, CALCIUM CHLORIDE 600; 310; 30; 20 MG/100ML; MG/100ML; MG/100ML; MG/100ML
75 INJECTION, SOLUTION INTRAVENOUS CONTINUOUS
Status: DISCONTINUED | OUTPATIENT
Start: 2017-08-28 | End: 2017-08-28

## 2017-08-28 RX ORDER — MIDAZOLAM HYDROCHLORIDE 1 MG/ML
1 INJECTION, SOLUTION INTRAMUSCULAR; INTRAVENOUS AS NEEDED
Status: DISCONTINUED | OUTPATIENT
Start: 2017-08-28 | End: 2017-08-28 | Stop reason: HOSPADM

## 2017-08-28 RX ORDER — OXYCODONE AND ACETAMINOPHEN 10; 325 MG/1; MG/1
1 TABLET ORAL
Status: DISCONTINUED | OUTPATIENT
Start: 2017-08-28 | End: 2017-08-30 | Stop reason: SDUPTHER

## 2017-08-28 RX ORDER — CHLORTHALIDONE 25 MG/1
25 TABLET ORAL DAILY
Status: DISCONTINUED | OUTPATIENT
Start: 2017-08-29 | End: 2017-09-01 | Stop reason: HOSPADM

## 2017-08-28 RX ORDER — SODIUM CHLORIDE 0.9 % (FLUSH) 0.9 %
5-10 SYRINGE (ML) INJECTION EVERY 8 HOURS
Status: DISCONTINUED | OUTPATIENT
Start: 2017-08-28 | End: 2017-08-28 | Stop reason: HOSPADM

## 2017-08-28 RX ORDER — MORPHINE SULFATE 10 MG/ML
2 INJECTION, SOLUTION INTRAMUSCULAR; INTRAVENOUS
Status: DISCONTINUED | OUTPATIENT
Start: 2017-08-28 | End: 2017-08-28 | Stop reason: HOSPADM

## 2017-08-28 RX ORDER — LIDOCAINE HYDROCHLORIDE 20 MG/ML
INJECTION, SOLUTION EPIDURAL; INFILTRATION; INTRACAUDAL; PERINEURAL AS NEEDED
Status: DISCONTINUED | OUTPATIENT
Start: 2017-08-28 | End: 2017-08-28 | Stop reason: HOSPADM

## 2017-08-28 RX ORDER — CEFAZOLIN SODIUM IN 0.9 % NACL 2 G/50 ML
2 INTRAVENOUS SOLUTION, PIGGYBACK (ML) INTRAVENOUS EVERY 6 HOURS
Status: COMPLETED | OUTPATIENT
Start: 2017-08-28 | End: 2017-08-29

## 2017-08-28 RX ORDER — PROPOFOL 10 MG/ML
INJECTION, EMULSION INTRAVENOUS AS NEEDED
Status: DISCONTINUED | OUTPATIENT
Start: 2017-08-28 | End: 2017-08-28 | Stop reason: HOSPADM

## 2017-08-28 RX ORDER — MORPHINE SULFATE 2 MG/ML
2 INJECTION, SOLUTION INTRAMUSCULAR; INTRAVENOUS
Status: DISCONTINUED | OUTPATIENT
Start: 2017-08-28 | End: 2017-09-01 | Stop reason: HOSPADM

## 2017-08-28 RX ORDER — LISINOPRIL 20 MG/1
40 TABLET ORAL DAILY
Status: DISCONTINUED | OUTPATIENT
Start: 2017-08-29 | End: 2017-09-01 | Stop reason: HOSPADM

## 2017-08-28 RX ORDER — ENOXAPARIN SODIUM 100 MG/ML
40 INJECTION SUBCUTANEOUS EVERY 24 HOURS
Status: DISCONTINUED | OUTPATIENT
Start: 2017-08-28 | End: 2017-08-29

## 2017-08-28 RX ORDER — DEXTROSE MONOHYDRATE AND SODIUM CHLORIDE 5; .45 G/100ML; G/100ML
25 INJECTION, SOLUTION INTRAVENOUS CONTINUOUS
Status: DISCONTINUED | OUTPATIENT
Start: 2017-08-28 | End: 2017-09-01 | Stop reason: HOSPADM

## 2017-08-28 RX ORDER — PROCHLORPERAZINE EDISYLATE 5 MG/ML
10 INJECTION INTRAMUSCULAR; INTRAVENOUS
Status: DISCONTINUED | OUTPATIENT
Start: 2017-08-28 | End: 2017-08-28 | Stop reason: SDUPTHER

## 2017-08-28 RX ORDER — FENTANYL CITRATE 50 UG/ML
25 INJECTION, SOLUTION INTRAMUSCULAR; INTRAVENOUS
Status: COMPLETED | OUTPATIENT
Start: 2017-08-28 | End: 2017-08-28

## 2017-08-28 RX ORDER — GLYCOPYRROLATE 0.2 MG/ML
INJECTION INTRAMUSCULAR; INTRAVENOUS AS NEEDED
Status: DISCONTINUED | OUTPATIENT
Start: 2017-08-28 | End: 2017-08-28 | Stop reason: HOSPADM

## 2017-08-28 RX ORDER — MORPHINE SULFATE 5 MG/ML
INJECTION, SOLUTION INTRAVENOUS CONTINUOUS
Status: DISCONTINUED | OUTPATIENT
Start: 2017-08-28 | End: 2017-08-30

## 2017-08-28 RX ORDER — ONDANSETRON 2 MG/ML
INJECTION INTRAMUSCULAR; INTRAVENOUS AS NEEDED
Status: DISCONTINUED | OUTPATIENT
Start: 2017-08-28 | End: 2017-08-28 | Stop reason: HOSPADM

## 2017-08-28 RX ORDER — HYDROMORPHONE HYDROCHLORIDE 1 MG/ML
INJECTION, SOLUTION INTRAMUSCULAR; INTRAVENOUS; SUBCUTANEOUS AS NEEDED
Status: DISCONTINUED | OUTPATIENT
Start: 2017-08-28 | End: 2017-08-28 | Stop reason: HOSPADM

## 2017-08-28 RX ORDER — HYDROMORPHONE HYDROCHLORIDE 1 MG/ML
0.2 INJECTION, SOLUTION INTRAMUSCULAR; INTRAVENOUS; SUBCUTANEOUS
Status: DISCONTINUED | OUTPATIENT
Start: 2017-08-28 | End: 2017-08-28 | Stop reason: HOSPADM

## 2017-08-28 RX ORDER — KETOROLAC TROMETHAMINE 30 MG/ML
30 INJECTION, SOLUTION INTRAMUSCULAR; INTRAVENOUS
Status: DISPENSED | OUTPATIENT
Start: 2017-08-28 | End: 2017-08-31

## 2017-08-28 RX ORDER — SODIUM CHLORIDE 9 MG/ML
25 INJECTION, SOLUTION INTRAVENOUS CONTINUOUS
Status: DISCONTINUED | OUTPATIENT
Start: 2017-08-28 | End: 2017-08-28 | Stop reason: HOSPADM

## 2017-08-28 RX ORDER — FENTANYL CITRATE 50 UG/ML
50 INJECTION, SOLUTION INTRAMUSCULAR; INTRAVENOUS AS NEEDED
Status: DISCONTINUED | OUTPATIENT
Start: 2017-08-28 | End: 2017-08-28 | Stop reason: HOSPADM

## 2017-08-28 RX ORDER — SUCCINYLCHOLINE CHLORIDE 20 MG/ML
INJECTION INTRAMUSCULAR; INTRAVENOUS AS NEEDED
Status: DISCONTINUED | OUTPATIENT
Start: 2017-08-28 | End: 2017-08-28 | Stop reason: HOSPADM

## 2017-08-28 RX ORDER — DEXAMETHASONE SODIUM PHOSPHATE 4 MG/ML
INJECTION, SOLUTION INTRA-ARTICULAR; INTRALESIONAL; INTRAMUSCULAR; INTRAVENOUS; SOFT TISSUE AS NEEDED
Status: DISCONTINUED | OUTPATIENT
Start: 2017-08-28 | End: 2017-08-28 | Stop reason: HOSPADM

## 2017-08-28 RX ORDER — SODIUM CHLORIDE, SODIUM LACTATE, POTASSIUM CHLORIDE, CALCIUM CHLORIDE 600; 310; 30; 20 MG/100ML; MG/100ML; MG/100ML; MG/100ML
INJECTION, SOLUTION INTRAVENOUS
Status: DISCONTINUED | OUTPATIENT
Start: 2017-08-28 | End: 2017-08-28 | Stop reason: HOSPADM

## 2017-08-28 RX ORDER — SODIUM CHLORIDE 0.9 % (FLUSH) 0.9 %
5-10 SYRINGE (ML) INJECTION EVERY 8 HOURS
Status: DISCONTINUED | OUTPATIENT
Start: 2017-08-28 | End: 2017-09-01 | Stop reason: HOSPADM

## 2017-08-28 RX ORDER — MORPHINE SULFATE 5 MG/ML
INJECTION, SOLUTION INTRAVENOUS
Status: COMPLETED
Start: 2017-08-28 | End: 2017-08-28

## 2017-08-28 RX ORDER — LORAZEPAM 2 MG/ML
1 INJECTION INTRAMUSCULAR
Status: DISCONTINUED | OUTPATIENT
Start: 2017-08-28 | End: 2017-09-01 | Stop reason: HOSPADM

## 2017-08-28 RX ORDER — NEOSTIGMINE METHYLSULFATE 1 MG/ML
INJECTION INTRAVENOUS AS NEEDED
Status: DISCONTINUED | OUTPATIENT
Start: 2017-08-28 | End: 2017-08-28 | Stop reason: HOSPADM

## 2017-08-28 RX ORDER — ALBUMIN HUMAN 50 G/1000ML
SOLUTION INTRAVENOUS AS NEEDED
Status: DISCONTINUED | OUTPATIENT
Start: 2017-08-28 | End: 2017-08-28 | Stop reason: HOSPADM

## 2017-08-28 RX ADMIN — SODIUM CHLORIDE, SODIUM LACTATE, POTASSIUM CHLORIDE, CALCIUM CHLORIDE: 600; 310; 30; 20 INJECTION, SOLUTION INTRAVENOUS at 09:31

## 2017-08-28 RX ADMIN — SUCCINYLCHOLINE CHLORIDE 200 MG: 20 INJECTION INTRAMUSCULAR; INTRAVENOUS at 09:37

## 2017-08-28 RX ADMIN — FENTANYL CITRATE 50 MCG: 50 INJECTION, SOLUTION INTRAMUSCULAR; INTRAVENOUS at 11:17

## 2017-08-28 RX ADMIN — MIDAZOLAM HYDROCHLORIDE 2 MG: 1 INJECTION, SOLUTION INTRAMUSCULAR; INTRAVENOUS at 09:31

## 2017-08-28 RX ADMIN — FENTANYL CITRATE 25 MCG: 50 INJECTION, SOLUTION INTRAMUSCULAR; INTRAVENOUS at 11:52

## 2017-08-28 RX ADMIN — GLYCOPYRROLATE 0.6 MG: 0.2 INJECTION INTRAMUSCULAR; INTRAVENOUS at 11:23

## 2017-08-28 RX ADMIN — FENTANYL CITRATE 25 MCG: 50 INJECTION, SOLUTION INTRAMUSCULAR; INTRAVENOUS at 12:00

## 2017-08-28 RX ADMIN — DEXTROSE MONOHYDRATE AND SODIUM CHLORIDE 125 ML/HR: 5; .45 INJECTION, SOLUTION INTRAVENOUS at 14:16

## 2017-08-28 RX ADMIN — MORPHINE SULFATE 2 MG: 10 INJECTION, SOLUTION INTRAMUSCULAR; INTRAVENOUS at 12:23

## 2017-08-28 RX ADMIN — Medication 10 ML: at 21:46

## 2017-08-28 RX ADMIN — MORPHINE SULFATE 2 MG: 10 INJECTION, SOLUTION INTRAMUSCULAR; INTRAVENOUS at 11:53

## 2017-08-28 RX ADMIN — MEPERIDINE HYDROCHLORIDE 12.5 MG: 25 INJECTION INTRAMUSCULAR; INTRAVENOUS; SUBCUTANEOUS at 11:53

## 2017-08-28 RX ADMIN — FENTANYL CITRATE 25 MCG: 50 INJECTION, SOLUTION INTRAMUSCULAR; INTRAVENOUS at 12:10

## 2017-08-28 RX ADMIN — CEFAZOLIN 2 G: 1 INJECTION, POWDER, FOR SOLUTION INTRAMUSCULAR; INTRAVENOUS; PARENTERAL at 16:06

## 2017-08-28 RX ADMIN — SODIUM CHLORIDE, SODIUM LACTATE, POTASSIUM CHLORIDE, CALCIUM CHLORIDE: 600; 310; 30; 20 INJECTION, SOLUTION INTRAVENOUS at 11:26

## 2017-08-28 RX ADMIN — ROCURONIUM BROMIDE 10 MG: 10 INJECTION, SOLUTION INTRAVENOUS at 10:14

## 2017-08-28 RX ADMIN — Medication 5 ML: at 14:00

## 2017-08-28 RX ADMIN — MORPHINE SULFATE 2 MG: 10 INJECTION, SOLUTION INTRAMUSCULAR; INTRAVENOUS at 12:13

## 2017-08-28 RX ADMIN — ONDANSETRON 4 MG: 2 INJECTION INTRAMUSCULAR; INTRAVENOUS at 11:06

## 2017-08-28 RX ADMIN — CEFAZOLIN 2 G: 1 INJECTION, POWDER, FOR SOLUTION INTRAMUSCULAR; INTRAVENOUS; PARENTERAL at 21:46

## 2017-08-28 RX ADMIN — ROCURONIUM BROMIDE 20 MG: 10 INJECTION, SOLUTION INTRAVENOUS at 10:33

## 2017-08-28 RX ADMIN — DEXTROSE MONOHYDRATE AND SODIUM CHLORIDE 125 ML/HR: 5; .45 INJECTION, SOLUTION INTRAVENOUS at 23:20

## 2017-08-28 RX ADMIN — LIDOCAINE HYDROCHLORIDE 100 MG: 20 INJECTION, SOLUTION EPIDURAL; INFILTRATION; INTRACAUDAL; PERINEURAL at 09:37

## 2017-08-28 RX ADMIN — MORPHINE SULFATE: 5 INJECTION, SOLUTION INTRAVENOUS at 12:14

## 2017-08-28 RX ADMIN — MORPHINE SULFATE 2 MG: 10 INJECTION, SOLUTION INTRAMUSCULAR; INTRAVENOUS at 12:31

## 2017-08-28 RX ADMIN — DEXAMETHASONE SODIUM PHOSPHATE 4 MG: 4 INJECTION, SOLUTION INTRA-ARTICULAR; INTRALESIONAL; INTRAMUSCULAR; INTRAVENOUS; SOFT TISSUE at 09:55

## 2017-08-28 RX ADMIN — MORPHINE SULFATE 2 MG: 10 INJECTION, SOLUTION INTRAMUSCULAR; INTRAVENOUS at 12:03

## 2017-08-28 RX ADMIN — HYDROMORPHONE HYDROCHLORIDE 0.2 MG: 1 INJECTION, SOLUTION INTRAMUSCULAR; INTRAVENOUS; SUBCUTANEOUS at 11:10

## 2017-08-28 RX ADMIN — FENTANYL CITRATE 50 MCG: 50 INJECTION, SOLUTION INTRAMUSCULAR; INTRAVENOUS at 11:45

## 2017-08-28 RX ADMIN — CEFOTETAN DISODIUM 2 G: 2 INJECTION, POWDER, FOR SOLUTION INTRAMUSCULAR; INTRAVENOUS at 09:50

## 2017-08-28 RX ADMIN — ROCURONIUM BROMIDE 5 MG: 10 INJECTION, SOLUTION INTRAVENOUS at 09:37

## 2017-08-28 RX ADMIN — Medication: at 12:14

## 2017-08-28 RX ADMIN — ROCURONIUM BROMIDE 35 MG: 10 INJECTION, SOLUTION INTRAVENOUS at 09:50

## 2017-08-28 RX ADMIN — HYDROMORPHONE HYDROCHLORIDE 0.4 MG: 1 INJECTION, SOLUTION INTRAMUSCULAR; INTRAVENOUS; SUBCUTANEOUS at 10:00

## 2017-08-28 RX ADMIN — HYDROMORPHONE HYDROCHLORIDE 0.4 MG: 1 INJECTION, SOLUTION INTRAMUSCULAR; INTRAVENOUS; SUBCUTANEOUS at 11:07

## 2017-08-28 RX ADMIN — NEOSTIGMINE METHYLSULFATE 5 MG: 1 INJECTION INTRAVENOUS at 11:23

## 2017-08-28 RX ADMIN — FENTANYL CITRATE 100 MCG: 50 INJECTION, SOLUTION INTRAMUSCULAR; INTRAVENOUS at 09:37

## 2017-08-28 RX ADMIN — ALBUMIN HUMAN 250 ML: 50 SOLUTION INTRAVENOUS at 10:07

## 2017-08-28 RX ADMIN — FENTANYL CITRATE 25 MCG: 50 INJECTION, SOLUTION INTRAMUSCULAR; INTRAVENOUS at 12:05

## 2017-08-28 RX ADMIN — PROPOFOL 200 MG: 10 INJECTION, EMULSION INTRAVENOUS at 09:37

## 2017-08-28 NOTE — IP AVS SNAPSHOT
[address] 
[phone] Patient: Kandace Crandall MRN: [id number] JJZ:[id number]/[id number] You are allergic to the following No active allergies Recent Documentation Height Weight Breastfeeding? BMI OB Status Smoking Status 1.651 m 102.5 kg No 37.61 kg/m2 Unknown Former Smoker Emergency Contacts Name Discharge Info Relation Home Work Mobile Isatu Alex DISCHARGE CAREGIVER [3] Other Relative [6] [phone] About your hospitalization You were admitted on:  August 28, 2017 You last received care in the:  [facility] [address] You were discharged on:  September 1, 2017 Unit phone number:  [phone] Why you were hospitalized Your primary diagnosis was:  Not on File Your diagnoses also included:  Intramural Leiomyoma Of Uterus, Ventral Hernia Providers Seen During Your Hospitalizations Provider Role Specialty Primary office phone Alex Ford MD Attending Provider Gynecologic Oncology [phone] Your Primary Care Physician (PCP) Primary Care Physician Office Phone Office Fax Elida Alvarez [phone] Follow-up Information Follow up With Details Comments Contact Info Mane Stokes MD   [address] [facility] [address] [place] [address] 
[phone] Current Discharge Medication List  
  
START taking these medications Dose & Instructions Dispensing Information Comments Morning Noon Evening Bedtime HYDROcodone-acetaminophen 7.5-325 mg per tablet Commonly known as:  Melony Ospina Your last dose was: Your next dose is:    
   
   
 Dose:  1 Tab Take 1 Tab by mouth every four (4) hours as needed. Max Daily Amount: 6 Tabs. Quantity:  30 Tab Refills:  0  
     
   
   
   
  
 ibuprofen 800 mg tablet Commonly known as:  MOTRIN  
   
 Your last dose was: Your next dose is:    
   
   
 Dose:  800 mg Take 1 Tab by mouth every eight (8) hours as needed for Pain. Quantity:  90 Tab Refills:  0 CONTINUE these medications which have NOT CHANGED Dose & Instructions Dispensing Information Comments Morning Noon Evening Bedtime  
 chlorthalidone 25 mg tablet Commonly known as:  Nitish Dickson Your last dose was: Your next dose is:    
   
   
 Dose:  25 mg Take 1 Tab by mouth daily. Quantity:  90 Tab Refills:  1 Iron 325 mg (65 mg iron) tablet Generic drug:  ferrous sulfate Your last dose was: Your next dose is:    
   
   
 Dose:  325 mg Take 325 mg by mouth Daily (before breakfast). Refills:  0  
     
   
   
   
  
 lisinopril 40 mg tablet Commonly known as:  Tiffanie Randall Your last dose was: Your next dose is:    
   
   
 Dose:  40 mg Take 40 mg by mouth daily. Refills:  0 Where to Get Your Medications Information on where to get these meds will be given to you by the nurse or doctor. ! Ask your nurse or doctor about these medications HYDROcodone-acetaminophen 7.5-325 mg per tablet  
 ibuprofen 800 mg tablet Discharge Instructions [address] [place] [address] Desmond Merl, MD Claudeen Lute, MD Ulyses Shivers, NP Patient Discharge Instructions Diane Oliveira / [id number] : 1980 Admitted 2017 Discharged: 2017 Take Home Medications No current facility-administered medications on file prior to encounter. Current Outpatient Prescriptions on File Prior to Encounter Medication Sig Dispense Refill  ferrous sulfate (IRON) 325 mg (65 mg iron) tablet Take 325 mg by mouth Daily (before breakfast). · It is important that you take the medication exactly as they are prescribed. · Keep your medication in the bottles provided by the pharmacist and keep a list of the medication names, dosages, and times to be taken in your wallet. · Do not take other medications without consulting your doctor. What to do at [facility] Recommended diet: Regular Diet, stay hydrated. You should take a stool softener such as colace for constipation. If constipation persists for >24 hours you should take a dose of Milk of Magnesia. Call if your constipation persists. If you have had a hysterectomy or vaginal surgery you may experience vaginal spotting. This is acceptable. Should the bleeding require more that 4 pads a day please call our office. Nothing per vagina for 6-8 weeks. Recommended activity: No driving for 1 week and/or while on pain medication Walk at least 6 times per day Showers okay, no tub bathing/swimming for two weeks Activity as able, stairs okay. If you had a laparoscopic procedure, no lifting greater than 25 lbs for 3 weeks. If you had an open procedure, no heavy lifting greater than 15 lbs for 6 weeks. If you experience any of the following persisting symptoms: 
 
Nausea/vomiting, fever > 101 F, diarrhea/constipation, increasing pain despite medication, increasing vaginal discharge or any concerns, please call our office. Follow-up with Dr. Kennedi Watters in 4 weeks. Call [phone] for an appointment. [employer] Activation Thank you for requesting access to [employer]. Please follow the instructions below to securely access and download your online medical record. [employer] allows you to send messages to your doctor, view your test results, renew your prescriptions, schedule appointments, and more. How Do I Sign Up? 1. In your internet browser, go to [email or web address]. 2. Click on the First Time User? Click Here link in the Sign In box. You will see the New Member Sign Up page. 3. Enter your [employer] Access Code exactly as it appears below. You will not need to use this code after youve completed the sign-up process. If you do not sign up before the expiration date, you must request a new code. [employer] Access Code: [email or web address] Expires: 10/7/2017  1:18 PM (This is the date your [employer] access code will ) 4. Enter the last four digits of your Social Security Number (xxxx) and Date of Birth (mm/dd/yyyy) as indicated and click Submit. You will be taken to the next sign-up page. 5. Create a [employer] ID. This will be your [employer] login ID and cannot be changed, so think of one that is secure and easy to remember. 6. Create a [employer] password. You can change your password at any time. 7. Enter your Password Reset Question and Answer. This can be used at a later time if you forget your password. 8. Enter your e-mail address. You will receive e-mail notification when new information is available in [address] 9. Click Sign Up. You can now view and download portions of your medical record. 10. Click the Download Summary menu link to download a portable copy of your medical information. Additional Information If you have questions, please visit the Frequently Asked Questions section of the [employer] website at [email or web address] Remember, [employer] is NOT to be used for urgent needs. For medical emergencies, dial 911. Information obtained by : 
I understand that if any problems occur once I am at home I am to contact my physician. I understand and acknowledge receipt of the instructions indicated above. Physician's or R.N.'s Signature                                                                  Date/Time Patient or Representative Signature                                                          Date/Time Discharge Orders None Introducing [facility]! Amira Jaya introduces [employer] patient portal. Now you can access parts of your medical record, email your doctor's office, and request medication refills online. 1. In your internet browser, go to [email or web address] 2. Click on the First Time User? Click Here link in the Sign In box. You will see the New Member Sign Up page. 3. Enter your [employer] Access Code exactly as it appears below. You will not need to use this code after youve completed the sign-up process. If you do not sign up before the expiration date, you must request a new code. · [employer] Access Code: [email or web address] Expires: 10/7/2017  1:18 PM 
 
4. Enter the last four digits of your Social Security Number (xxxx) and Date of Birth (mm/dd/yyyy) as indicated and click Submit. You will be taken to the next sign-up page. 5. Create a [employer] ID. This will be your [employer] login ID and cannot be changed, so think of one that is secure and easy to remember. 6. Create a [employer] password. You can change your password at any time. 7. Enter your Password Reset Question and Answer. This can be used at a later time if you forget your password. 8. Enter your e-mail address. You will receive e-mail notification when new information is available in [address] 9. Click Sign Up. You can now view and download portions of your medical record. 10. Click the Download Summary menu link to download a portable copy of your medical information. If you have questions, please visit the Frequently Asked Questions section of the [employer] website. Remember, [employer] is NOT to be used for urgent needs. For medical emergencies, dial 911. Now available from your iPhone and Android! General Information Please provide this summary of care documentation to your next provider. Patient Signature:  ____________________________________________________________ Date:  ____________________________________________________________  
  
Larri Hazard Provider Signature:  ____________________________________________________________ Date:  ____________________________________________________________

## 2017-08-28 NOTE — H&P
[address] [place] [address] Mathias Moritz 933, [address] [facility]  [phone]  MD Lilliana Juarez Patient, MD Rasheeda Rogers NP      Patient ID:  Name:  Diane Landeros  MRN:  [id number]  :  1980/37 y.o. Date:  2017      HISTORY OF PRESENT ILLNESS:  Kunal Hernandez is a 40 y.o.  premenopausal female who is being seen for symptomatic uterine fibroids. She is referred by Dr. Santiago Rahman. She was recently hospitalized for severe anemia and was transfused 2 units PRBCs. She has a history of myomectomy in . Following that procedure the her symptoms improved for a few years, but since about  they have become progressively worse. In addition to having transfusions, she has also had multiple iron transfusions in the past.  She has heavy periods and reports significant abdominal pain. I have been asked to see her in consultation for further evaluation and management.       She has not had children and wishes she could, but she is very realistic about it. She has been told by numerous gynecologists over the last 10 years that she needs a hysterectomy. She has now accepted that fact.         ROS:   and GI review:                   Negative  Cardiopulmonary review:Negative   Musculoskeletal:                                            Negative     A comprehensive review of systems was negative except for that written in the History of Present Illness.  , 10 point ROS     OB/GYN ROS:    Hx of myomectomy in       Problem List:  Patient Active Problem List    Diagnosis Date Noted    Essential hypertension 2017    Obesity (BMI 30-39.9) 2017    HAM (obstructive sleep apnea) 2017    Iron deficiency anemia due to chronic blood loss 2017    Fibroids 2017    Hypertension 2017    Generalized abdominal pain 2017     PMH:  Past Medical History:   Diagnosis Date    Anemia     Asthma     AS A CHILD, OUTGREW IT    Fibroids     Hypertension     Sleep apnea     HAS A C-PAP    Uterine fibroid       PSH:  Past Surgical History:   Procedure Laterality Date    HX GYN  2008    MESH INSERTION BETWEEN UTERINE AND RECTUM    HX MYOMECTOMY      HX MYOMECTOMY      HX TONSILLECTOMY      INSERT [address] FLR ADDON      MYOMECTOMY 1-4,W/TOT 250GMS/<, W [facility]  2006      Social History:  Social History   Substance Use Topics    Smoking status: Former Smoker     Quit date: 4/6/2017    Smokeless tobacco: Never Used      Comment: WAS A SOCIAL SMOKER WHEN SMOKED    Alcohol use Yes      Comment: REARLY      Family History:  Family History   Problem Relation Age of Onset   Dede He Cancer Mother      lung    Hypertension Mother     Hypertension Father     Diabetes Father     Heart Disease Father     Stroke Father     Cancer Maternal Grandmother      lung    No Known Problems Sister     Diabetes Brother     No Known Problems Brother     No Known Problems Brother     Anesth Problems Neg Hx       Medications: (reviewed)  No current facility-administered medications for this encounter. Current Outpatient Prescriptions   Medication Sig    lisinopril (PRINIVIL, ZESTRIL) 40 mg tablet Take 40 mg by mouth daily.  chlorthalidone (HYGROTEN) 25 mg tablet Take 1 Tab by mouth daily.  ferrous sulfate (IRON) 325 mg (65 mg iron) tablet Take 325 mg by mouth Daily (before breakfast). Allergies: (reviewed)  No Known Allergies       OBJECTIVE:    Physical Exam:  VITAL SIGNS: There were no vitals filed for this visit. There is no height or weight on file to calculate BMI. GENERAL DENISE: Conversant, alert, oriented. No acute distress. HEENT: HEENT. No thyroid enlargement. No JVD. Neck: Supple without restrictions. RESPIRATORY: Clear to auscultation and percussion to the bases. No CVAT. CARDIOVASC: RRR without murmur/rub.    GASTROINT: soft, non-tender, without masses or organomegaly   MUSCULOSKEL: no joint tenderness, deformity or swelling   EXTREMITIES: extremities normal, atraumatic, no cyanosis or edema   PELVIC: Normal appearing external genitalia. Normal vagina. Normal cervix. Uterus markedly enlarged with numerous fibroids. It was minimally mobile. RECTAL: Deferred   MIRNA SURVEY: No suspicious lymphadenopathy or edema noted. NEURO: Grossly intact. No acute deficit. Lab Results   Component Value Date/Time    WBC 4.7 08/23/2017 08:46 AM    HGB 8.3 08/23/2017 08:46 AM    HCT 28.7 08/23/2017 08:46 AM    PLATELET 567 12/26/8236 08:46 AM    MCV 70.0 08/23/2017 08:46 AM     Lab Results   Component Value Date/Time    Sodium 137 08/23/2017 08:46 AM    Potassium 4.1 08/23/2017 08:46 AM    Chloride 103 08/23/2017 08:46 AM    CO2 26 08/23/2017 08:46 AM    Anion gap 8 08/23/2017 08:46 AM    Glucose 92 08/23/2017 08:46 AM    BUN 12 08/23/2017 08:46 AM    Creatinine 0.76 08/23/2017 08:46 AM    BUN/Creatinine ratio 16 08/23/2017 08:46 AM    GFR est AA >60 08/23/2017 08:46 AM    GFR est non-AA >60 08/23/2017 08:46 AM    Calcium 8.6 08/23/2017 08:46 AM       CT of abdomen/pelvis (7/6/17)  Imaging of the abdomen was performed prior to optimal enhancement of the liver. The hepatic veins are not enhanced and the parenchyma is incompletely enhanced. Liver: The liver is normal in size and contour with no focal abnormality. Gallbladder and bile ducts: There is a calcified stone in the gallbladder. Spleen: No abnormality. Pancreas: No abnormality. Adrenal glands: No abnormality. Kidneys: No abnormality.      PELVIS:  Reproductive organs: The uterus is enlarged and nodular in contour measuring  15.1 x 14 x 19 cm. There is a suggestion of a large endometrial mass with  slightly increased attenuation. The ovaries are not visualized. Bladder: No abnormality.       BOWEL AND MESENTERY: The small bowel is normal.  There is no mesenteric mass or  adenopathy.  The appendix is normal.      PERITONEUM: There is no ascites or free intraperitoneal air.      RETROPERITONEUM: The aorta  tapers without aneurysm. There is no retroperitoneal  adenopathy or mass. There is no pelvic mass or adenopathy.      BONES AND SOFT TISSUES: There is diffuse body edema. The bones are within normal  limits.      IMPRESSION  IMPRESSION:   1. Markedly enlarged uterus . This could all represent fibroids, however  neoplasm is not excluded. 2. Pericardial effusion. 3. Diffuse body edema. 4. Gallstone.        IMPRESSION/PLAN:  Darcie Damon is a 40 y.o. female with a working diagnosis of symptomatic uterine fibroids, producing pain and menorrhagia with anemia. I reviewed with Darcie Damon her medical records, physical exam, and review of symptoms. I reviewed the CT images with her. I don't think she is a good candidate for a repeat myomectomy. Even with a successful myomectomy, I think her chances of actually getting pregnant afterwards are very unlikely. I have recommended abdominal hysterectomy with ovarian preservation if possible. She is likely to have extensive adhesions from her prior procedure. She is not a candidate for a minimally invasive procedure. She was counseled on the risks, benefits, indications, and alternatives of surgery. Her questions were answered and she wishes to proceed.       Signed By: Chriss Edouard MD     8/28/2017/7:09 AM

## 2017-08-28 NOTE — PROGRESS NOTES
TRANSFER - IN REPORT:    Verbal report received from Ripu RN(name) on [place]  being received from PACU(unit) for routine post - op      Report consisted of patients Situation, Background, Assessment and   Recommendations(SBAR). Information from the following report(s) SBAR, Kardex and OR Summary was reviewed with the receiving nurse. Opportunity for questions and clarification was provided. Assessment completed upon patients arrival to unit and care assumed.

## 2017-08-28 NOTE — PERIOP NOTES
TRANSFER - OUT REPORT:    Verbal report given to Allegra Sotelo RN(name) on Tracie Forbes  being transferred to [facility](unit) for routine post - op       Report consisted of patients Situation, Background, Assessment and   Recommendations(SBAR). Time Pre op antibiotic given:950  Anesthesia Stop time: 1143  Merchant Present on Transfer to floor:y  Order for Merchant on Chart:y    Information from the following report(s) SBAR, Kardex, Intake/Output and MAR was reviewed with the receiving nurse. Opportunity for questions and clarification was provided. Is the patient on 02? YES       L/Min 2       Other na    Is the patient on a monitor? NO    Is the nurse transporting with the patient? NO    Surgical Waiting Area notified of patient's transfer from PACU?  YES      The following personal items collected during your admission accompanied patient upon transfer:   Dental Appliance:    Vision:    Hearing Aid:    Jewelry:    Clothing:    Other Valuables:    Valuables sent to safe:

## 2017-08-28 NOTE — BRIEF OP NOTE
BRIEF OPERATIVE NOTE    Date of Procedure: 8/28/2017   Preoperative Diagnosis: UTERINE FIBROIDS  Postoperative Diagnosis: UTERINE FIBROIDS    Procedure(s):  TOTAL ABDOMINAL HYSTERECTOMY, BILATERAL SALPINGECTOMY, LEFT OOPHERECTOMY,REPAIR OF VENTRAL HERNIA  Surgeon(s) and Role:     * Micky Buckley MD - Primary     * Nessa Hurley MD - Assisting         Assistant Staff:       Surgical Staff:  Circ-1: Jimenez Iniguez RN  Circ-Relief: Jeane Oliva RN  Scrub Tech-1: Sima Whpiple  Scrub RN-Relief: Federico Allan  Event Time In   Incision Start 0382   Incision Close      Anesthesia: General   Estimated Blood Loss: 100CC  Specimens:   ID Type Source Tests Collected by Time Destination   1 : uterus, cervix, bilateral fallopian tubes, left ovary Fresh Uterus with Bilateral Fallopian Tubes  Humaira Buck MD 8/28/2017 1052 Pathology   2 : ventral hernia sac Fresh Tissue  Humaira Buck MD 8/28/2017 1108 Pathology      Findings: Fibroid ;;uterus, ovaries fibrosed to corpus.  No evidence of peritoneal disease   Complications: None  Implants: * No implants in log *

## 2017-08-28 NOTE — PERIOP NOTES
Patient interviewed in holding area, identity and procedure verified. 1000 ml nacl on field as irrigation. Family member updated as to start of surgery.

## 2017-08-28 NOTE — PROGRESS NOTES
Bedside and Verbal shift change report given to Latonya Herr and Patricia Blankenship  (oncoming nurse) by Leslie Murray (offgoing nurse). Report included the following information SBAR, Kardex and OR Summary.

## 2017-08-28 NOTE — ANESTHESIA POSTPROCEDURE EVALUATION
Post-Anesthesia Evaluation and Assessment    Patient: Blanca Shen MRN: [id number]  SSN: [id number]    YOB: 1980  Age: 40 y.o. Sex: female       Cardiovascular Function/Vital Signs  Visit Vitals    /80    Pulse 94    Temp 36.6 °C (97.8 °F)    Resp 28    Ht 5' 5\" (1.651 m)    Wt 102.5 kg (226 lb)    SpO2 (!) 81%    BMI 37.61 kg/m2       Patient is status post general anesthesia for Procedure(s):  TOTAL ABDOMINAL HYSTERECTOMY, BILATERAL SALPINGECTOMY, LEFT OOPHERECTOMY, VENTRAL HERNIA REPAIR. Nausea/Vomiting: None    Postoperative hydration reviewed and adequate. Pain:  Pain Scale 1: Numeric (0 - 10) (08/28/17 1156)  Pain Intensity 1: 10 (08/28/17 1156)   Managed    Neurological Status:   Neuro (WDL): Within Defined Limits (08/28/17 1145)   At baseline    Mental Status and Level of Consciousness: Arousable    Pulmonary Status:   O2 Device: Nasal cannula (08/28/17 1145)   Adequate oxygenation and airway patent    Complications related to anesthesia: None    Post-anesthesia assessment completed.  No concerns    Signed By: Yaneli Carcamo MD     August 28, 2017

## 2017-08-28 NOTE — PERIOP NOTES
Patient: Grzegorz Maldonado MRN: [id number]  SSN: [id number]   YOB: 1980  Age: 40 y.o. Sex: female     Patient is status post Procedure(s):  TOTAL ABDOMINAL HYSTERECTOMY, BILATERAL SALPINGECTOMY, LEFT OOPHERECTOMY, VENTRAL HERNIA REPAIR.     Surgeon(s) and Role:     * Oswaldo Kim MD - Primary     * Niko Noble MD - Assisting                  Peripheral IV 08/28/17 Right Hand (Active)                           Dressing/Packing:  Wound Abdomen Anterior;Mid-DRESSING TYPE: [employer] products (08/28/17 1100)

## 2017-08-28 NOTE — PROGRESS NOTES
Dual skin assessment performed by [address] [place] [address] Loren and Jordin Green. Skin intact aside from midline incision from ordered procedure.

## 2017-08-28 NOTE — IP AVS SNAPSHOT
[address] 
[phone] Patient: Heather Sterling MRN: [id number]:8/67/[id number] Current Discharge Medication List  
  
START taking these medications Dose & Instructions Dispensing Information Comments Morning Noon Evening Bedtime HYDROcodone-acetaminophen 7.5-325 mg per tablet Commonly known as:  Cristiano Saha Your last dose was: Your next dose is:    
   
   
 Dose:  1 Tab Take 1 Tab by mouth every four (4) hours as needed. Max Daily Amount: 6 Tabs. Quantity:  30 Tab Refills:  0  
     
   
   
   
  
 ibuprofen 800 mg tablet Commonly known as:  MOTRIN Your last dose was: Your next dose is:    
   
   
 Dose:  800 mg Take 1 Tab by mouth every eight (8) hours as needed for Pain. Quantity:  90 Tab Refills:  0 CONTINUE these medications which have NOT CHANGED Dose & Instructions Dispensing Information Comments Morning Noon Evening Bedtime  
 chlorthalidone 25 mg tablet Commonly known as:  Favio Gowers Your last dose was: Your next dose is:    
   
   
 Dose:  25 mg Take 1 Tab by mouth daily. Quantity:  90 Tab Refills:  1 Iron 325 mg (65 mg iron) tablet Generic drug:  ferrous sulfate Your last dose was: Your next dose is:    
   
   
 Dose:  325 mg Take 325 mg by mouth Daily (before breakfast). Refills:  0  
     
   
   
   
  
 lisinopril 40 mg tablet Commonly known as:  Iván Pompa Your last dose was: Your next dose is:    
   
   
 Dose:  40 mg Take 40 mg by mouth daily. Refills:  0 Where to Get Your Medications Information on where to get these meds will be given to you by the nurse or doctor. ! Ask your nurse or doctor about these medications HYDROcodone-acetaminophen 7.5-325 mg per tablet ibuprofen 800 mg tablet

## 2017-08-29 ENCOUNTER — TELEPHONE (OUTPATIENT)
Dept: GYNECOLOGY | Age: 37
End: 2017-08-29

## 2017-08-29 LAB
ANION GAP SERPL CALC-SCNC: 7 MMOL/L (ref 5–15)
BASOPHILS # BLD: 0 K/UL (ref 0–0.1)
BASOPHILS NFR BLD: 0 % (ref 0–1)
BUN SERPL-MCNC: 4 MG/DL (ref 6–20)
BUN/CREAT SERPL: 6 (ref 12–20)
CALCIUM SERPL-MCNC: 8.4 MG/DL (ref 8.5–10.1)
CHLORIDE SERPL-SCNC: 103 MMOL/L (ref 97–108)
CO2 SERPL-SCNC: 26 MMOL/L (ref 21–32)
CREAT SERPL-MCNC: 0.7 MG/DL (ref 0.55–1.02)
DIFFERENTIAL METHOD BLD: ABNORMAL
EOSINOPHIL # BLD: 0 K/UL (ref 0–0.4)
EOSINOPHIL NFR BLD: 0 % (ref 0–7)
ERYTHROCYTE [DISTWIDTH] IN BLOOD BY AUTOMATED COUNT: 22.6 % (ref 11.5–14.5)
GLUCOSE SERPL-MCNC: 123 MG/DL (ref 65–100)
HCT VFR BLD AUTO: 25.9 % (ref 35–47)
HGB BLD-MCNC: 7.5 G/DL (ref 11.5–16)
LYMPHOCYTES # BLD: 1 K/UL (ref 0.8–3.5)
LYMPHOCYTES NFR BLD: 11 % (ref 12–49)
MCH RBC QN AUTO: 20 PG (ref 26–34)
MCHC RBC AUTO-ENTMCNC: 29 G/DL (ref 30–36.5)
MCV RBC AUTO: 69.1 FL (ref 80–99)
MONOCYTES # BLD: 0.6 K/UL (ref 0–1)
MONOCYTES NFR BLD: 6 % (ref 5–13)
NEUTS SEG # BLD: 7.8 K/UL (ref 1.8–8)
NEUTS SEG NFR BLD: 83 % (ref 32–75)
PLATELET # BLD AUTO: 147 K/UL (ref 150–400)
PLATELET COMMENTS,PCOM: ABNORMAL
POTASSIUM SERPL-SCNC: 3.7 MMOL/L (ref 3.5–5.1)
RBC # BLD AUTO: 3.75 M/UL (ref 3.8–5.2)
RBC MORPH BLD: ABNORMAL
SODIUM SERPL-SCNC: 136 MMOL/L (ref 136–145)
WBC # BLD AUTO: 9.4 K/UL (ref 3.6–11)

## 2017-08-29 PROCEDURE — 74011250636 HC RX REV CODE- 250/636: Performed by: OBSTETRICS & GYNECOLOGY

## 2017-08-29 PROCEDURE — 85025 COMPLETE CBC W/AUTO DIFF WBC: CPT | Performed by: OBSTETRICS & GYNECOLOGY

## 2017-08-29 PROCEDURE — 74011000258 HC RX REV CODE- 258: Performed by: OBSTETRICS & GYNECOLOGY

## 2017-08-29 PROCEDURE — 80048 BASIC METABOLIC PNL TOTAL CA: CPT | Performed by: OBSTETRICS & GYNECOLOGY

## 2017-08-29 PROCEDURE — 65210000002 HC RM PRIVATE GYN

## 2017-08-29 PROCEDURE — 36415 COLL VENOUS BLD VENIPUNCTURE: CPT | Performed by: OBSTETRICS & GYNECOLOGY

## 2017-08-29 PROCEDURE — 74011250637 HC RX REV CODE- 250/637: Performed by: OBSTETRICS & GYNECOLOGY

## 2017-08-29 RX ORDER — METOCLOPRAMIDE HYDROCHLORIDE 5 MG/ML
5 INJECTION INTRAMUSCULAR; INTRAVENOUS EVERY 6 HOURS
Status: DISCONTINUED | OUTPATIENT
Start: 2017-08-29 | End: 2017-09-01 | Stop reason: HOSPADM

## 2017-08-29 RX ORDER — SIMETHICONE 80 MG
80 TABLET,CHEWABLE ORAL
Status: DISCONTINUED | OUTPATIENT
Start: 2017-08-29 | End: 2017-09-01 | Stop reason: HOSPADM

## 2017-08-29 RX ADMIN — Medication 10 ML: at 22:35

## 2017-08-29 RX ADMIN — DEXTROSE MONOHYDRATE AND SODIUM CHLORIDE 125 ML/HR: 5; .45 INJECTION, SOLUTION INTRAVENOUS at 15:20

## 2017-08-29 RX ADMIN — KETOROLAC TROMETHAMINE 30 MG: 30 INJECTION, SOLUTION INTRAMUSCULAR at 01:23

## 2017-08-29 RX ADMIN — DEXTROSE MONOHYDRATE AND SODIUM CHLORIDE 125 ML/HR: 5; .45 INJECTION, SOLUTION INTRAVENOUS at 07:29

## 2017-08-29 RX ADMIN — CEFAZOLIN 2 G: 1 INJECTION, POWDER, FOR SOLUTION INTRAMUSCULAR; INTRAVENOUS; PARENTERAL at 04:22

## 2017-08-29 RX ADMIN — Medication 10 ML: at 01:23

## 2017-08-29 RX ADMIN — KETOROLAC TROMETHAMINE 30 MG: 30 INJECTION, SOLUTION INTRAMUSCULAR at 08:40

## 2017-08-29 RX ADMIN — DEXTROSE MONOHYDRATE AND SODIUM CHLORIDE 125 ML/HR: 5; .45 INJECTION, SOLUTION INTRAVENOUS at 22:48

## 2017-08-29 RX ADMIN — LISINOPRIL 40 MG: 20 TABLET ORAL at 09:22

## 2017-08-29 RX ADMIN — Medication 10 ML: at 15:22

## 2017-08-29 RX ADMIN — Medication 10 ML: at 08:40

## 2017-08-29 RX ADMIN — METOCLOPRAMIDE 5 MG: 5 INJECTION, SOLUTION INTRAMUSCULAR; INTRAVENOUS at 22:35

## 2017-08-29 RX ADMIN — CHLORTHALIDONE 25 MG: 25 TABLET ORAL at 09:22

## 2017-08-29 RX ADMIN — METOCLOPRAMIDE 5 MG: 5 INJECTION, SOLUTION INTRAMUSCULAR; INTRAVENOUS at 15:21

## 2017-08-29 RX ADMIN — KETOROLAC TROMETHAMINE 30 MG: 30 INJECTION, SOLUTION INTRAMUSCULAR at 17:50

## 2017-08-29 RX ADMIN — METOCLOPRAMIDE 5 MG: 5 INJECTION, SOLUTION INTRAMUSCULAR; INTRAVENOUS at 08:40

## 2017-08-29 NOTE — PROGRESS NOTES
Patient having increased drainage from midline incision. Drainage covers about 70% of midline dressing. Notified Dr. Gia Rodriguez. Will continue to monitor.

## 2017-08-29 NOTE — TELEPHONE ENCOUNTER
The pt states the company continues to email her requesting information we have already faxed. She requested I fax again and she will let them know to contact me with further questions. I advised her I will fax and advise for them to contact me as well.

## 2017-08-29 NOTE — PROGRESS NOTES
Bedside and Verbal shift change report given to [facility] (oncoming nurse) by Yimi Camarillo (offgoing nurse). Report included the following information SBAR, Kardex, MAR and Recent Results.

## 2017-08-29 NOTE — PROGRESS NOTES
Care Management Interventions  PCP Verified by CM: Yes  Mode of Transport at Discharge: Other (see comment) (private vehicle)  Discharge Durable Medical Equipment: No  Physical Therapy Consult: No  Occupational Therapy Consult: No  Speech Therapy Consult: No  Current Support Network:  (independent with ADLs)  Confirm Follow Up Transport: Family  Plan discussed with Pt/Family/Caregiver: Yes  Freedom of Choice Offered: Yes  Discharge Location  Discharge Placement: Home    CM reviewed chart. Pt is independent with ADLs and ADLs, and does not use any assistive devices. Pt lives in an apartment. Pt has supportive friends and family. Pt's PCP is Dr. Elgin Soliz, and she gets her prescriptions filled at her local [address] [place] [address]. Plan is to return home at time of discharge. CM will continue to be available incase any discharge needs arise.   Lidia Jensen, BSW, ACM

## 2017-08-29 NOTE — PROGRESS NOTES
[address] [phone]         [phone]    MD Edita Garcia MD Toma Oram. Manas Sotelo NP     Patient Name: Kiara Justin Date: 8/28/2017   OR Date: 8/28/2017       Visit Date: 8/29/2017        SUBJECTIVE:    No complaints this morning. No nausea. Pain controlled. OBJECTIVE:    Patient Vitals for the past 24 hrs:   Temp Pulse Resp BP SpO2   08/29/17 0406 98.2 °F (36.8 °C) 67 18 154/88 100 %   08/29/17 0001 98.1 °F (36.7 °C) 81 18 152/88 97 %   08/28/17 2219 - 72 - (!) 156/96 100 %   08/28/17 2047 98.1 °F (36.7 °C) 80 20 144/89 97 %   08/28/17 1649 97.6 °F (36.4 °C) 66 20 151/88 98 %   08/28/17 1550 96.8 °F (36 °C) 78 20 147/84 99 %   08/28/17 1444 97.7 °F (36.5 °C) 84 20 156/89 100 %   08/28/17 1350 97.8 °F (36.6 °C) 77 22 158/87 100 %   08/28/17 1310 - 85 27 - 100 %   08/28/17 1305 - 95 28 - 97 %   08/28/17 1300 - 85 20 151/83 100 %   08/28/17 1255 - 86 23 - 100 %   08/28/17 1250 - 80 26 - 99 %   08/28/17 1245 - 79 19 143/80 100 %   08/28/17 1240 - 94 26 - 94 %   08/28/17 1230 - 93 23 (!) 145/91 90 %   08/28/17 1225 - 88 29 - 91 %   08/28/17 1220 - 86 24 - 97 %   08/28/17 1215 - 73 17 145/88 100 %   08/28/17 1210 - 82 25 - 100 %   08/28/17 1205 - 92 28 - (!) 89 %   08/28/17 1200 - 94 28 153/80 (!) 81 %   08/28/17 1155 - 95 21 148/80 (!) 87 %   08/28/17 1150 - 89 21 153/80 (!) 59 %   08/28/17 1145 97.8 °F (36.6 °C) 74 16 139/84 94 %   08/28/17 0842 98.2 °F (36.8 °C) 70 17 140/90 100 %         Date 08/28/17 0700 - 08/29/17 0659 08/29/17 0700 - 08/30/17 0659   Shift 2522-0374 1146-3156 24 Hour Total 5733-3911 3321-9019 24 Hour Total   I  N  T  A  K  E   P. O. 75  75         P. O. 75  75       I.V.  (mL/kg/hr) 1391.7  (1.1)  1391.7  (0.6)         I.V. 300  300         Volume (dextrose 5 % - 0.45% NaCl infusion) 91.7  91.7         Volume (lactated Ringers infusion) 1000  1000         Volume (0.9% sodium chloride infusion) 0  0         Volume (ceFAZolin in 0.9% NS (ANCEF) IVPB soln 2 g) 0  0         Volume (promethazine (PHENERGAN) 12.5 mg in NS 50 mL IVPB) 0  0       Shift Total  (mL/kg) 1466.7  (14.3)  1466.7  (14.3)      O  U  T  P  U  T   Urine  (mL/kg/hr) 800  (0.7) 1450  (1.2) 2250  (0.9)         Urine Voided  250 250         Urine Output 200  200         Urine Output (mL) (Urinary Catheter 08/28/17 2- way; Merchant) 600 1200 1800       Blood 200  200         Estimated Blood Loss 200  200       Shift Total  (mL/kg) 1000  (9.8) 1450  (14.1) 2450  (23.9)      .7 -1450 -983. 3      Weight (kg) 102.5 102.5 102.5 102.5 102.5 102.5       Physical Exam     General:  alert, cooperative, no distress     Cardiac:  Regular rate and rhythm        Lungs:  clear to auscultation bilaterally  Abdomen:  soft, non-tender, without masses or organomegaly       Wound:  moderate amount of bloody drainage   Extremity: extremities normal, atraumatic, no cyanosis or edema      Lab Results   Component Value Date/Time    WBC 9.4 08/29/2017 04:16 AM    ABS. NEUTROPHILS 7.8 08/29/2017 04:16 AM    HGB 7.5 08/29/2017 04:16 AM    HCT 25.9 08/29/2017 04:16 AM    MCV 69.1 08/29/2017 04:16 AM    MCH 20.0 08/29/2017 04:16 AM    PLATELET 633 07/32/3390 04:16 AM     Lab Results   Component Value Date/Time    Sodium 136 08/29/2017 04:16 AM    Potassium 3.7 08/29/2017 04:16 AM    Chloride 103 08/29/2017 04:16 AM    CO2 26 08/29/2017 04:16 AM    Glucose 123 08/29/2017 04:16 AM    BUN 4 08/29/2017 04:16 AM    Creatinine 0.70 08/29/2017 04:16 AM    Calcium 8.4 08/29/2017 04:16 AM    Albumin 3.6 08/23/2017 08:46 AM    Bilirubin, total 0.8 08/23/2017 08:46 AM    AST (SGOT) 12 08/23/2017 08:46 AM    ALT (SGPT) 21 08/23/2017 08:46 AM    Alk. phosphatase 59 08/23/2017 08:46 AM       IMPRESSION/PLAN:    1 Day Post-Op Procedure(s):  TOTAL ABDOMINAL HYSTERECTOMY, BILATERAL SALPINGECTOMY, LEFT OOPHERECTOMY, VENTRAL HERNIA REPAIR for UTERINE FIBROIDS    Gynecologic:  Large uterine fibroids.    Heme/CV:  Slight, but expected drop in Hgb this morning. Continue to monitor. Hemodynamically stable. Renal:  Good UOP. Normal creatinine. FEN/GI:  Start clears today. Advance as tolerated. ID/Wound:  Afebrile. Normal WBC. PPX:  Will hold Lovenox due to oozing from incision. Dispostion:  Doing well postoperatively. Continue routine post-op care.         Maryam Lopez MD

## 2017-08-29 NOTE — PROGRESS NOTES
Bedside and Verbal shift change report given to Naldo Gonzalez RN (oncoming nurse) by Mustapha Wnog and Hal MIGUEL (offgoing nurse). Report included the following information SBAR, Kardex, OR Summary, Intake/Output, MAR and Recent Results.

## 2017-08-30 LAB
ANION GAP SERPL CALC-SCNC: 6 MMOL/L (ref 5–15)
BASOPHILS # BLD: 0.1 K/UL (ref 0–0.1)
BASOPHILS NFR BLD: 1 % (ref 0–1)
BUN SERPL-MCNC: 2 MG/DL (ref 6–20)
BUN/CREAT SERPL: 3 (ref 12–20)
CALCIUM SERPL-MCNC: 8.6 MG/DL (ref 8.5–10.1)
CHLORIDE SERPL-SCNC: 101 MMOL/L (ref 97–108)
CO2 SERPL-SCNC: 28 MMOL/L (ref 21–32)
CREAT SERPL-MCNC: 0.61 MG/DL (ref 0.55–1.02)
DIFFERENTIAL METHOD BLD: ABNORMAL
EOSINOPHIL # BLD: 0.4 K/UL (ref 0–0.4)
EOSINOPHIL NFR BLD: 6 % (ref 0–7)
ERYTHROCYTE [DISTWIDTH] IN BLOOD BY AUTOMATED COUNT: 22.7 % (ref 11.5–14.5)
GLUCOSE SERPL-MCNC: 114 MG/DL (ref 65–100)
HCT VFR BLD AUTO: 25.3 % (ref 35–47)
HGB BLD-MCNC: 7.4 G/DL (ref 11.5–16)
LYMPHOCYTES # BLD: 1.6 K/UL (ref 0.8–3.5)
LYMPHOCYTES NFR BLD: 26 % (ref 12–49)
MCH RBC QN AUTO: 19.9 PG (ref 26–34)
MCHC RBC AUTO-ENTMCNC: 29.2 G/DL (ref 30–36.5)
MCV RBC AUTO: 68.2 FL (ref 80–99)
MONOCYTES # BLD: 0.5 K/UL (ref 0–1)
MONOCYTES NFR BLD: 8 % (ref 5–13)
NEUTS SEG # BLD: 3.6 K/UL (ref 1.8–8)
NEUTS SEG NFR BLD: 59 % (ref 32–75)
PLATELET # BLD AUTO: 152 K/UL (ref 150–400)
PLATELET COMMENTS,PCOM: ABNORMAL
POTASSIUM SERPL-SCNC: 3.3 MMOL/L (ref 3.5–5.1)
RBC # BLD AUTO: 3.71 M/UL (ref 3.8–5.2)
RBC MORPH BLD: ABNORMAL
RBC MORPH BLD: ABNORMAL
SODIUM SERPL-SCNC: 135 MMOL/L (ref 136–145)
WBC # BLD AUTO: 6.2 K/UL (ref 3.6–11)

## 2017-08-30 PROCEDURE — 74011250637 HC RX REV CODE- 250/637: Performed by: OBSTETRICS & GYNECOLOGY

## 2017-08-30 PROCEDURE — 74011000250 HC RX REV CODE- 250: Performed by: OBSTETRICS & GYNECOLOGY

## 2017-08-30 PROCEDURE — 80048 BASIC METABOLIC PNL TOTAL CA: CPT | Performed by: OBSTETRICS & GYNECOLOGY

## 2017-08-30 PROCEDURE — 65210000002 HC RM PRIVATE GYN

## 2017-08-30 PROCEDURE — 85025 COMPLETE CBC W/AUTO DIFF WBC: CPT | Performed by: OBSTETRICS & GYNECOLOGY

## 2017-08-30 PROCEDURE — 74011250636 HC RX REV CODE- 250/636: Performed by: OBSTETRICS & GYNECOLOGY

## 2017-08-30 PROCEDURE — 36415 COLL VENOUS BLD VENIPUNCTURE: CPT | Performed by: OBSTETRICS & GYNECOLOGY

## 2017-08-30 RX ORDER — LISINOPRIL 20 MG/1
40 TABLET ORAL ONCE
Status: COMPLETED | OUTPATIENT
Start: 2017-08-30 | End: 2017-08-30

## 2017-08-30 RX ORDER — OXYCODONE AND ACETAMINOPHEN 5; 325 MG/1; MG/1
1-2 TABLET ORAL
Status: DISCONTINUED | OUTPATIENT
Start: 2017-08-30 | End: 2017-09-01

## 2017-08-30 RX ORDER — LORAZEPAM 2 MG/ML
1 INJECTION INTRAMUSCULAR ONCE
Status: COMPLETED | OUTPATIENT
Start: 2017-08-30 | End: 2017-08-30

## 2017-08-30 RX ADMIN — Medication 10 ML: at 15:17

## 2017-08-30 RX ADMIN — KETOROLAC TROMETHAMINE 30 MG: 30 INJECTION, SOLUTION INTRAMUSCULAR at 03:37

## 2017-08-30 RX ADMIN — LISINOPRIL 40 MG: 20 TABLET ORAL at 09:19

## 2017-08-30 RX ADMIN — LORAZEPAM 1 MG: 2 INJECTION INTRAMUSCULAR; INTRAVENOUS at 20:48

## 2017-08-30 RX ADMIN — Medication 10 ML: at 07:13

## 2017-08-30 RX ADMIN — LISINOPRIL 40 MG: 20 TABLET ORAL at 17:36

## 2017-08-30 RX ADMIN — METOCLOPRAMIDE 5 MG: 5 INJECTION, SOLUTION INTRAMUSCULAR; INTRAVENOUS at 15:15

## 2017-08-30 RX ADMIN — SODIUM CHLORIDE 10 MG: 9 INJECTION INTRAMUSCULAR; INTRAVENOUS; SUBCUTANEOUS at 10:39

## 2017-08-30 RX ADMIN — METOCLOPRAMIDE 5 MG: 5 INJECTION, SOLUTION INTRAMUSCULAR; INTRAVENOUS at 09:18

## 2017-08-30 RX ADMIN — Medication 10 ML: at 03:38

## 2017-08-30 RX ADMIN — METOCLOPRAMIDE 5 MG: 5 INJECTION, SOLUTION INTRAMUSCULAR; INTRAVENOUS at 03:38

## 2017-08-30 RX ADMIN — CHLORTHALIDONE 25 MG: 25 TABLET ORAL at 09:19

## 2017-08-30 RX ADMIN — METOCLOPRAMIDE 5 MG: 5 INJECTION, SOLUTION INTRAMUSCULAR; INTRAVENOUS at 20:52

## 2017-08-30 NOTE — PROGRESS NOTES
[address] [phone]         [phone]    MD Rebekah Peterson MD Clevester Manas. Mariela Torrez NP     Patient Name: Glenna Naqvi Date: 8/28/2017   OR Date: 8/28/2017   Visit Date: 8/30/2017        SUBJECTIVE:    No complaints this morning. No nausea. Pain controlled. Passing flatus. Ambulating. OBJECTIVE:    Patient Vitals for the past 24 hrs:   Temp Pulse Resp BP SpO2   08/30/17 0706 98.2 °F (36.8 °C) 69 16 143/89 100 %   08/30/17 0325 98.6 °F (37 °C) 75 16 (!) 152/95 -   08/29/17 2227 98.7 °F (37.1 °C) 74 16 (!) 133/94 99 %   08/29/17 1919 97.7 °F (36.5 °C) 71 16 148/89 99 %   08/29/17 1559 - 66 16 (!) 156/98 100 %   08/29/17 1210 97.5 °F (36.4 °C) 70 18 (!) 157/107 100 %   08/29/17 0837 95.9 °F (35.5 °C) 66 18 (!) 146/93 100 %         Date 08/29/17 0700 - 08/30/17 0659 08/30/17 0700 - 08/31/17 0659   Shift 1759-7820 0779-4735 24 Hour Total 0752-6828 8905-2182 24 Hour Total   I  N  T  A  K  E   P. O. 600  600 50  50      P. O. 600  600 50  50    I.V.  (mL/kg/hr) 937.5  (0.8) 1004.2  (0.8) 1941.7  (0.8)         Volume (dextrose 5 % - 0.45% NaCl infusion) 937.5 1004. 2 1941. 7       Shift Total  (mL/kg) 1537.5  (15) 1004.2  (9.8) 2541.7  (24.8) 50  (0.5)  50  (0.5)   O  U  T  P  U  T   Urine  (mL/kg/hr) 3550  (2.9) 1750  (1.4) 5300  (2.2) 375  375      Urine Output (mL) (Urinary Catheter 08/28/17 2- way; Merchant) 3550 1750 5300 375  375    Shift Total  (mL/kg) 3550  (34.6) 1750  (17.1) 5300  (51.7) 375  (3.7)  375  (3.7)   NET -2012.5 -745.8 -2758.3 -325  -325   Weight (kg) 102.5 102.5 102.5 102.5 102.5 102.5       Physical Exam     General:  alert, cooperative, no distress     Cardiac:  Regular rate and rhythm        Lungs:  clear to auscultation bilaterally  Abdomen:  soft, non-tender, without masses or organomegaly       Wound: dressing dry and without further drainage   Extremity: extremities normal, atraumatic, no cyanosis or edema      Lab Results Component Value Date/Time    WBC 6.2 08/30/2017 03:46 AM    ABS. NEUTROPHILS 3.6 08/30/2017 03:46 AM    HGB 7.4 08/30/2017 03:46 AM    HCT 25.3 08/30/2017 03:46 AM    MCV 68.2 08/30/2017 03:46 AM    MCH 19.9 08/30/2017 03:46 AM    PLATELET 351 51/95/8736 03:46 AM     Lab Results   Component Value Date/Time    Sodium 135 08/30/2017 03:46 AM    Potassium 3.3 08/30/2017 03:46 AM    Chloride 101 08/30/2017 03:46 AM    CO2 28 08/30/2017 03:46 AM    Glucose 114 08/30/2017 03:46 AM    BUN 2 08/30/2017 03:46 AM    Creatinine 0.61 08/30/2017 03:46 AM    Calcium 8.6 08/30/2017 03:46 AM    Albumin 3.6 08/23/2017 08:46 AM    Bilirubin, total 0.8 08/23/2017 08:46 AM    AST (SGOT) 12 08/23/2017 08:46 AM    ALT (SGPT) 21 08/23/2017 08:46 AM    Alk. phosphatase 59 08/23/2017 08:46 AM       IMPRESSION/PLAN:    2 Day Post-Op Procedure(s):  TOTAL ABDOMINAL HYSTERECTOMY, BILATERAL SALPINGECTOMY, LEFT OOPHERECTOMY, VENTRAL HERNIA REPAIR for UTERINE FIBROIDS    Gynecologic:  Large uterine fibroids. Heme/CV:  Slight, but expected drop in Hgb. Unchanged today. Hemodynamically stable. Renal:  Good UOP. Normal creatinine. FEN/GI:  Tolerating clears. Bowel function returning. Will advance to GI lite diet. ID/Wound:  Afebrile. Normal WBC. PPX:  Continue to hold Lovenox due to previous oozing from incision. Dispostion:  Doing well postoperatively. Continue routine post-op care.   Possible PM d/c vs tomorrow Adali Frye MD

## 2017-08-30 NOTE — PROGRESS NOTES
Bedside and Verbal shift change report given to LAMONT Yates (oncoming nurse) by Malcom Warner RN (offgoing nurse). Report included the following information SBAR, Kardex, Intake/Output, Accordion and Recent Results.

## 2017-08-31 ENCOUNTER — APPOINTMENT (OUTPATIENT)
Dept: GENERAL RADIOLOGY | Age: 37
DRG: 743 | End: 2017-08-31
Attending: OBSTETRICS & GYNECOLOGY
Payer: COMMERCIAL

## 2017-08-31 PROCEDURE — 65210000002 HC RM PRIVATE GYN

## 2017-08-31 PROCEDURE — 74011000250 HC RX REV CODE- 250: Performed by: OBSTETRICS & GYNECOLOGY

## 2017-08-31 PROCEDURE — 74011250637 HC RX REV CODE- 250/637: Performed by: OBSTETRICS & GYNECOLOGY

## 2017-08-31 PROCEDURE — 74011250636 HC RX REV CODE- 250/636: Performed by: OBSTETRICS & GYNECOLOGY

## 2017-08-31 PROCEDURE — 74020 XR ABD (AP AND ERECT OR DECUB): CPT

## 2017-08-31 RX ORDER — FACIAL-BODY WIPES
10 EACH TOPICAL DAILY PRN
Status: DISCONTINUED | OUTPATIENT
Start: 2017-08-31 | End: 2017-09-01 | Stop reason: HOSPADM

## 2017-08-31 RX ORDER — METOCLOPRAMIDE HYDROCHLORIDE 5 MG/ML
5 INJECTION INTRAMUSCULAR; INTRAVENOUS EVERY 6 HOURS
Status: DISCONTINUED | OUTPATIENT
Start: 2017-08-31 | End: 2017-08-31 | Stop reason: SDUPTHER

## 2017-08-31 RX ADMIN — METOCLOPRAMIDE 5 MG: 5 INJECTION, SOLUTION INTRAMUSCULAR; INTRAVENOUS at 08:39

## 2017-08-31 RX ADMIN — METOCLOPRAMIDE 5 MG: 5 INJECTION, SOLUTION INTRAMUSCULAR; INTRAVENOUS at 14:00

## 2017-08-31 RX ADMIN — BISACODYL 10 MG: 10 SUPPOSITORY RECTAL at 08:38

## 2017-08-31 RX ADMIN — SODIUM CHLORIDE 10 MG: 9 INJECTION INTRAMUSCULAR; INTRAVENOUS; SUBCUTANEOUS at 01:36

## 2017-08-31 RX ADMIN — CHLORTHALIDONE 25 MG: 25 TABLET ORAL at 08:38

## 2017-08-31 RX ADMIN — METOCLOPRAMIDE 5 MG: 5 INJECTION, SOLUTION INTRAMUSCULAR; INTRAVENOUS at 03:24

## 2017-08-31 RX ADMIN — METOCLOPRAMIDE 5 MG: 5 INJECTION, SOLUTION INTRAMUSCULAR; INTRAVENOUS at 22:09

## 2017-08-31 RX ADMIN — LISINOPRIL 40 MG: 20 TABLET ORAL at 08:38

## 2017-08-31 NOTE — PROGRESS NOTES
Bedside shift change report given to Milli Klein  (oncoming nurse) by Pillo Figueroa RN  (offgoing nurse). Report included the following information SBAR.

## 2017-08-31 NOTE — PROGRESS NOTES
Problem: Falls - Risk of  Goal: *Absence of Falls  Document Guru Fall Risk and appropriate interventions in the flowsheet.    Outcome: Progressing Towards Goal  Fall Risk Interventions:  Mobility Interventions: Patient to call before getting OOB     Mentation Interventions: Door open when patient unattended, Toileting rounds     Medication Interventions: Evaluate medications/consider consulting pharmacy, Patient to call before getting OOB     Elimination Interventions: Call light in reach

## 2017-08-31 NOTE — PROGRESS NOTES
Bedside and Verbal shift change report given to Tae Poe (oncoming nurse) by Brandi Krueger RN (offgoing nurse). Report included the following information SBAR, Kardex, OR Summary, Procedure Summary, Intake/Output, MAR, Accordion and Recent Results.

## 2017-08-31 NOTE — PROGRESS NOTES
Bedside and Verbal shift change report given to [facility] (oncoming nurse) by Eldon Garza (offgoing nurse). Report included the following information SBAR, Kardex, Intake/Output, MAR, Accordion and Recent Results.

## 2017-08-31 NOTE — OP NOTES
Gynecologic Oncology Operative Report    Carol Morse  8/31/2017    Pre-operative dx:  Uterine fibroids    Post-operative dx:  Uterine fibroids, ventral hernia    Procedure: 1) Total abdominal hysterectomy    2) Bilateral salpingectomy    3) Left oophorectomy    4) Ventral hernia    Surgeon:  Marii Dunn MD    Assistant:  Kenia Alatorre MD     Anesthesia:  GETA    EBL:  718 cc    Complications:  None    Specimens:  uterus, cervix, bilateral fallopian tubes, left ovary, ventral hernia sac    Operative indications:  41 yo BF with symptomatic uterine fibroids. She underwent a prior myomectomy in 2006. Since 2012 she has been having more pelvic discomfort and abnormal bleeding. She has actually required transfusions in the past because of the bleeding. I recommended definitive hysterectomy. Due to the large size of the uterus, I felt she was not a candidate for a laparoscopic or robotic procedures. Operative findings:  Markedly enlarged fibroid uterus. Normal tubes and ovaries. Adhesions of rectosigmoid colon to left adnexa and posterior uterus. 3-4 cm ventral hernia several centimeters cephalad to the umbilicus. Upper abdomen within normal limits. Procedure in detail:    After the risks, benefits, indications, and alternatives of the procedure were discussed with the patient and informed consent was obtained, the patient was taken to the operating room where she was identified as the correct patient. She was then administered general anesthesia and placed in the supine position. She was then prepped and draped in the usual fashion. A Merchant catheter was inserted. The abdomen was entered via vertical midline skin incision extending from the pubic symphysis to a several centimeters above the umbilicus. Upon entering the abdominal cavity, the above-mentioned findings were noted. The hernia sac at the upper aspect of the incision was excised with cautery. A Bookwalter retractor was then placed.  The adhesions of the recosigmoid colon were then taken down from the left adnexa and the posterior uterus with a combination of sharp and blunt dissection. The bowel was then packed into the upper abdomen with moist lap sponges. We started with our dissection on the left side of the patient. The left round ligament was identified. It was coagulated and transected with electrocautery, allowing entry into the retroperitoneal space, where the left ureter was identified. The left ovary was fibrosed to the uterine fundus and I could not develop a plane to separate them. Since the right ovary appeared to be easily  I elected to proceed with removal of the left ovary with the uterus. The left ovarian vessels were then isolated. A window was created in the posterior leaf of the broad ligament between the ureter and the left ovarian vessels. The ovarian vessels were then coagulated and transected with the large Ethicon Enseal device. We then moved to the right side of the patient and identified the right round ligament. It was coagulated and transected with electrocautery, allowing entry into the retroperitoneal space, where the right ureter was identified. Using the Enseal device, the right ovary was  from the uterine cornua and then from the fallopian tube. The posterior leaf of the broad ligament was then divided with cautery down to the level of the uterine cervix. We then moved anteriorly and began sharply dissecting the bladder off the lower uterine segment and cervix. We then skeletonized the uterine arteries bilaterally along the lateral aspect of the cervix. Curved Zeppelin clamps were placed at the level of the internal cervical os on each side. The pedicles were transected and then ligated with Vicryl suture. Due to difficulty with visualization at this point, we elected to amputate the uterine fundus from the cervix. This was done with cautery.   Once amputated, the cervical stump was grasped with Kocher clamps. We then moved down the cervix, taking straight Zeppelin clamp bites on each side of the cervix, staying medial to the last bite. Each of these pedicles was transected and ligated with Vicryl suture. At this point, we were able to come across the upper vagina just below the cervix with angled Zeppelin clamps coming from each side and meeting in the midline. The specimen was then excised with Mauro scissors and sent for pathology. The vaginal cuff was then closed with a 0 Vicryl suture in a running locking fashion, utilizing the [place] technique. Excellent reapproximation of the cuff was noted. At this point, we irrigated the pelvis and made sure there was no evidence of bleeding. We then removed all lap sponges and retractors, and the abdominal wall was then closed with a number 1 looped PDS suture in a running, nonlocking fashion. Subcutaneous tissues were made hemostatic with electrocautery, and the skin was closed with stainless steel staples. A sterile pressure dressing was then applied. The patient was then awakened from anesthesia and taken to the recovery room in stable condition. All sponge, lap, and needle counts were correct.       Carlis Merlin., MD  8/31/2017  8:18 AM

## 2017-08-31 NOTE — PROGRESS NOTES
[address] [phone]         [phone]    MD Abiel De La Fuente MD Johann Flicker. Delight Hodgkin, NP     Patient Name: Mary Little Date: 8/28/2017   OR Date: 8/28/2017   Visit Date: 8/31/2017        SUBJECTIVE:    Has had nausea since yesterday with several episodes of emesis. Pain controlled. Still passing flatus, but no BM. Ambulating. OBJECTIVE:    Patient Vitals for the past 24 hrs:   Temp Pulse Resp BP SpO2   08/31/17 0638 98.2 °F (36.8 °C) 83 20 151/89 98 %   08/31/17 0020 98.5 °F (36.9 °C) 82 20 (!) 162/95 100 %   08/30/17 2137 97.5 °F (36.4 °C) 87 18 (!) 157/93 100 %   08/30/17 2055 97.9 °F (36.6 °C) 76 16 (!) 175/116 99 %   08/30/17 1921 97 °F (36.1 °C) 71 16 (!) 159/92 98 %   08/30/17 1731 - - - (!) 177/110 -   08/30/17 1641 97.8 °F (36.6 °C) 73 18 (!) 195/105 99 %   08/30/17 1437 98.4 °F (36.9 °C) 71 16 (!) 164/97 100 %   08/30/17 0817 98.3 °F (36.8 °C) 62 16 (!) 150/93 100 %         Date 08/30/17 0700 - 08/31/17 0659 08/31/17 0700 - 09/01/17 0659   Shift 8191-7664 8092-7781 24 Hour Total 6576-4349 5758-3162 24 Hour Total   I  N  T  A  K  E   P. O.          P. O.        I.V.  (mL/kg/hr) 1373.8  (1.1) 327.9  (0.3) 1701.7  (0.7)         Volume (dextrose 5 % - 0.45% NaCl infusion) 1373.8 327.9 1701.7       Shift Total  (mL/kg) 1953.8  (19.1) 1037.9  (10.1) 2991.7  (29.2)      O  U  T  P  U  T   Urine  (mL/kg/hr) 1525  (1.2) 650  (0.5) 2175  (0.9)         Urine Voided 2620 457 7347         Urine Output (mL) ([REMOVED] Urinary Catheter 08/28/17 2- way; Merchant) 525  525       Emesis/NG output  600 600         Emesis  600 600         Emesis Occurrence(s)  3 x 3 x       Shift Total  (mL/kg) 1525  (14.9) 1250  (12.2) 2775  (27.1)      .8 -212.1 216.7      Weight (kg) 102.5 102.5 102.5 102.5 102.5 102.5       Physical Exam     General:  alert, cooperative, no distress     Cardiac:  Regular rate and rhythm        Lungs:  clear to auscultation bilaterally  Abdomen:  soft, mildly distended, appropriately tender       Wound: dressing dry and without further drainage   Extremity: extremities normal, atraumatic, no cyanosis or edema      Lab Results   Component Value Date/Time    WBC 6.2 08/30/2017 03:46 AM    ABS. NEUTROPHILS 3.6 08/30/2017 03:46 AM    HGB 7.4 08/30/2017 03:46 AM    HCT 25.3 08/30/2017 03:46 AM    MCV 68.2 08/30/2017 03:46 AM    MCH 19.9 08/30/2017 03:46 AM    PLATELET 253 67/58/8191 03:46 AM     Lab Results   Component Value Date/Time    Sodium 135 08/30/2017 03:46 AM    Potassium 3.3 08/30/2017 03:46 AM    Chloride 101 08/30/2017 03:46 AM    CO2 28 08/30/2017 03:46 AM    Glucose 114 08/30/2017 03:46 AM    BUN 2 08/30/2017 03:46 AM    Creatinine 0.61 08/30/2017 03:46 AM    Calcium 8.6 08/30/2017 03:46 AM    Albumin 3.6 08/23/2017 08:46 AM    Bilirubin, total 0.8 08/23/2017 08:46 AM    AST (SGOT) 12 08/23/2017 08:46 AM    ALT (SGPT) 21 08/23/2017 08:46 AM    Alk. phosphatase 59 08/23/2017 08:46 AM       IMPRESSION/PLAN:    3 Day Post-Op Procedure(s):  TOTAL ABDOMINAL HYSTERECTOMY, BILATERAL SALPINGECTOMY, LEFT OOPHERECTOMY, VENTRAL HERNIA REPAIR for UTERINE FIBROIDS    Gynecologic:  Large uterine fibroids. FINAL PATHOLOGIC DIAGNOSIS   Uterus, cervix, left ovary, and bilateral fallopian tubes, hysterectomy, bilateral salpingectomies, and left oophorectomy:   Cervix:   Negative for dysplasia   Endometrium:   Secretory type endometrium   Myometrium:   Leiomyomas   Bilateral fallopian tubes:   Sections of benign fallopian tubes   Left ovary:   Benign ovary with physiologic changes and leiomyoma   2. Ventral hernia sac, removal   Fibroadipose tissue consistent with hernia sac    Heme/CV:  Slight, but expected drop in Hgb. Hemodynamically stable. Renal:  Good UOP. Normal creatinine. FEN/GI:  Nausea/vomiting over the last 24 hours. Will check a flat/upright. I suspect a post-operative ileus. Ambulation encouraged.   Gum chewing also encouraged. I will start her on Reglan and offered her a Dulcolax suppository. ID/Wound:  Afebrile. Normal WBC. PPX:  Continue to hold Lovenox due to previous oozing from incision. Ambulation. Incentive spirometry. Dispostion:  Doing well postoperatively. Continue routine post-op care.          Logan Castellanos MD

## 2017-09-01 VITALS
OXYGEN SATURATION: 98 % | HEIGHT: 65 IN | RESPIRATION RATE: 20 BRPM | WEIGHT: 226 LBS | SYSTOLIC BLOOD PRESSURE: 138 MMHG | BODY MASS INDEX: 37.65 KG/M2 | HEART RATE: 73 BPM | TEMPERATURE: 97.7 F | DIASTOLIC BLOOD PRESSURE: 92 MMHG

## 2017-09-01 LAB
ABO + RH BLD: NORMAL
BLD PROD TYP BPU: NORMAL
BLD PROD TYP BPU: NORMAL
BLOOD GROUP ANTIBODIES SERPL: NORMAL
BPU ID: NORMAL
BPU ID: NORMAL
CROSSMATCH RESULT,%XM: NORMAL
CROSSMATCH RESULT,%XM: NORMAL
SPECIMEN EXP DATE BLD: NORMAL
STATUS OF UNIT,%ST: NORMAL
STATUS OF UNIT,%ST: NORMAL
UNIT DIVISION, %UDIV: 0
UNIT DIVISION, %UDIV: 0

## 2017-09-01 PROCEDURE — 74011250636 HC RX REV CODE- 250/636: Performed by: OBSTETRICS & GYNECOLOGY

## 2017-09-01 PROCEDURE — 74011250637 HC RX REV CODE- 250/637: Performed by: OBSTETRICS & GYNECOLOGY

## 2017-09-01 RX ORDER — HYDROCODONE BITARTRATE AND ACETAMINOPHEN 7.5; 325 MG/1; MG/1
1 TABLET ORAL
Qty: 30 TAB | Refills: 0 | Status: SHIPPED | OUTPATIENT
Start: 2017-09-01

## 2017-09-01 RX ORDER — IBUPROFEN 800 MG/1
800 TABLET ORAL
Qty: 90 TAB | Refills: 0 | Status: SHIPPED | OUTPATIENT
Start: 2017-09-01

## 2017-09-01 RX ORDER — HYDROCODONE BITARTRATE AND ACETAMINOPHEN 7.5; 325 MG/1; MG/1
1 TABLET ORAL
Status: DISCONTINUED | OUTPATIENT
Start: 2017-09-01 | End: 2017-09-01 | Stop reason: HOSPADM

## 2017-09-01 RX ADMIN — LISINOPRIL 40 MG: 20 TABLET ORAL at 08:40

## 2017-09-01 RX ADMIN — METOCLOPRAMIDE 5 MG: 5 INJECTION, SOLUTION INTRAMUSCULAR; INTRAVENOUS at 04:23

## 2017-09-01 RX ADMIN — CHLORTHALIDONE 25 MG: 25 TABLET ORAL at 08:40

## 2017-09-01 RX ADMIN — METOCLOPRAMIDE 5 MG: 5 INJECTION, SOLUTION INTRAMUSCULAR; INTRAVENOUS at 08:41

## 2017-09-01 NOTE — PROGRESS NOTES
[address]  [phone]         [phone]    MD Robert Plata MD Carole Li. Konstantin Romero NP     Patient Name: Alvis Baumgarten Date: 8/28/2017   OR Date: 8/28/2017   Visit Date: 9/1/2017        SUBJECTIVE:    Nausea/vomiting resolved. Several bowel movements yesterday. Feeling much better. Passing flatus, but still reports some gas pains. OBJECTIVE:    Patient Vitals for the past 24 hrs:   Temp Pulse Resp BP SpO2   09/01/17 0147 97.9 °F (36.6 °C) 78 18 138/90 98 %   08/31/17 2056 98.5 °F (36.9 °C) 80 18 151/83 100 %   08/31/17 1610 98.3 °F (36.8 °C) 84 18 (!) 167/98 100 %   08/31/17 1205 98.6 °F (37 °C) 82 18 (!) 164/94 98 %         Date 08/31/17 0700 - 09/01/17 0659 09/01/17 0700 - 09/02/17 0659   Shift 5028-9100 0324-1416 24 Hour Total 0950-4172 2088-7029 24 Hour Total   I  N  T  A  K  E   P.O. 120 480 600         P. O. 120 480 600       I.V.  (mL/kg/hr) 237.9  (0.2) 358.7  (0.3) 596.7  (0.2)         Volume (dextrose 5 % - 0.45% NaCl infusion) 237.9 358.7 596.7       Shift Total  (mL/kg) 357.9  (3.5) 838.7  (8.2) 1196.7  (11.7)      O  U  T  P  U  T   Urine  (mL/kg/hr) 1000  (0.8) 800  (0.7) 1800  (0.7)         Urine Voided 8990 795 0035         Urine Occurrence(s)  1 x 1 x       Stool            Stool Occurrence(s) 2 x  2 x       Shift Total  (mL/kg) 1000  (9.8) 800  (7.8) 1800  (17.6)      NET -642.1 38.7 -603.3      Weight (kg) 102.5 102.5 102.5 102.5 102.5 102.5       Physical Exam     General:  alert, cooperative, no distress     Cardiac:  Regular rate and rhythm        Lungs:  clear to auscultation bilaterally  Abdomen:  soft, mildly distended, appropriately tender       Wound: dressing dry and without further drainage   Extremity: extremities normal, atraumatic, no cyanosis or edema      Lab Results   Component Value Date/Time    WBC 6.2 08/30/2017 03:46 AM    ABS.  NEUTROPHILS 3.6 08/30/2017 03:46 AM    HGB 7.4 08/30/2017 03:46 AM    HCT 25.3 08/30/2017 03:46 AM    MCV 68.2 08/30/2017 03:46 AM    MCH 19.9 08/30/2017 03:46 AM    PLATELET 486 03/59/1054 03:46 AM     Lab Results   Component Value Date/Time    Sodium 135 08/30/2017 03:46 AM    Potassium 3.3 08/30/2017 03:46 AM    Chloride 101 08/30/2017 03:46 AM    CO2 28 08/30/2017 03:46 AM    Glucose 114 08/30/2017 03:46 AM    BUN 2 08/30/2017 03:46 AM    Creatinine 0.61 08/30/2017 03:46 AM    Calcium 8.6 08/30/2017 03:46 AM    Albumin 3.6 08/23/2017 08:46 AM    Bilirubin, total 0.8 08/23/2017 08:46 AM    AST (SGOT) 12 08/23/2017 08:46 AM    ALT (SGPT) 21 08/23/2017 08:46 AM    Alk. phosphatase 59 08/23/2017 08:46 AM       IMPRESSION/PLAN:    4 Day Post-Op Procedure(s):  TOTAL ABDOMINAL HYSTERECTOMY, BILATERAL SALPINGECTOMY, LEFT OOPHERECTOMY, VENTRAL HERNIA REPAIR for UTERINE FIBROIDS    Gynecologic:  Large uterine fibroids. FINAL PATHOLOGIC DIAGNOSIS   Uterus, cervix, left ovary, and bilateral fallopian tubes, hysterectomy, bilateral salpingectomies, and left oophorectomy:   Cervix:   Negative for dysplasia   Endometrium:   Secretory type endometrium   Myometrium:   Leiomyomas   Bilateral fallopian tubes:   Sections of benign fallopian tubes   Left ovary:   Benign ovary with physiologic changes and leiomyoma   2. Ventral hernia sac, removal   Fibroadipose tissue consistent with hernia sac    Heme/CV:  Slight, but expected drop in Hgb. Hemodynamically stable. Renal:  Good UOP. Normal creatinine. FEN/GI:  Nausea/vomiting resolved. Having flatus and BMs. Tolerating PO.     ID/Wound:  Afebrile. Normal WBC. PPX:  Continue to hold Lovenox due to previous oozing from incision. Ambulation. Incentive spirometry. Dispostion:  Doing well postoperatively. Will d/c home today. Follow up next week for jaun.         Rito Cabral MD

## 2017-09-01 NOTE — DISCHARGE INSTRUCTIONS
MD Renetta Varma MD Lucetta Harding, BRIAN    Patient Discharge Instructions    Spencer Cristina / [id number] : 1980    Admitted 2017 Discharged: 2017     Take Home Medications     No current facility-administered medications on file prior to encounter. Current Outpatient Prescriptions on File Prior to Encounter   Medication Sig Dispense Refill    ferrous sulfate (IRON) 325 mg (65 mg iron) tablet Take 325 mg by mouth Daily (before breakfast). · It is important that you take the medication exactly as they are prescribed. · Keep your medication in the bottles provided by the pharmacist and keep a list of the medication names, dosages, and times to be taken in your wallet. · Do not take other medications without consulting your doctor. What to do at Home    Recommended diet: Regular Diet, stay hydrated. You should take a stool softener such as colace for constipation. If constipation persists for >24 hours you should take a dose of Milk of Magnesia. Call if your constipation persists. If you have had a hysterectomy or vaginal surgery you may experience vaginal spotting. This is acceptable. Should the bleeding require more that 4 pads a day please call our office. Nothing per vagina for 6-8 weeks. Recommended activity:   No driving for 1 week and/or while on pain medication  Walk at least 6 times per day  Showers okay, no tub bathing/swimming for two weeks  Activity as able, stairs okay. If you had a laparoscopic procedure, no lifting greater than 25 lbs for 3 weeks. If you had an open procedure, no heavy lifting greater than 15 lbs for 6 weeks. If you experience any of the following persisting symptoms:    Nausea/vomiting, fever > 101 F, diarrhea/constipation, increasing pain despite medication, increasing vaginal discharge or any concerns, please call our office. Follow-up with Dr. Melanie Caldera in 4 weeks.   Call [phone] for an appointment. MyChart Activation    Thank you for requesting access to [employer]. Please follow the instructions below to securely access and download your online medical record. [employer] allows you to send messages to your doctor, view your test results, renew your prescriptions, schedule appointments, and more. How Do I Sign Up? 1. In your internet browser, go to [email or web address] In[employer]. 2. Click on the First Time User? Click Here link in the Sign In box. You will see the New Member Sign Up page. 3. Enter your [employer] Access Code exactly as it appears below. You will not need to use this code after youve completed the sign-up process. If you do not sign up before the expiration date, you must request a new code. [employer] Access Code: [email or web address]  Expires: 10/7/2017  1:18 PM (This is the date your [employer] access code will )    4. Enter the last four digits of your Social Security Number (xxxx) and Date of Birth (mm/dd/yyyy) as indicated and click Submit. You will be taken to the next sign-up page. 5. Create a [employer] ID. This will be your [employer] login ID and cannot be changed, so think of one that is secure and easy to remember. 6. Create a [employer] password. You can change your password at any time. 7. Enter your Password Reset Question and Answer. This can be used at a later time if you forget your password. 8. Enter your e-mail address. You will receive e-mail notification when new information is available in [address] 9. Click Sign Up. You can now view and download portions of your medical record. 10. Click the Download Summary menu link to download a portable copy of your medical information. Additional Information    If you have questions, please visit the Frequently Asked Questions section of the [employer] website at [email or web address] Connect/[employer] In[employer]/. Remember, [employer] is NOT to be used for urgent needs.  For medical emergencies, dial 911.          Information obtained by :  I understand that if any problems occur once I am at home I am to contact my physician. I understand and acknowledge receipt of the instructions indicated above.                                                                                                                                            Physician's or R.N.'s Signature                                                                  Date/Time                                                                                                                                              Patient or Representative Signature                                                          Date/Time

## 2017-09-05 NOTE — DISCHARGE SUMMARY
Discharge Summary     Patient: Cain Liu MRN: [id number]  SSN: [id number]    YOB: 1980  Age: 40 y.o. Sex: female       Admit Date: 8/28/2017    Discharge Date: 9/5/2017      Admission Diagnoses: UTERINE FIBROIDS; Intramural leiomyoma of uterus    Discharge Diagnoses:   Problem List as of 9/1/2017  Date Reviewed: 8/21/2017          Codes Class Noted - Resolved    Intramural leiomyoma of uterus ICD-10-CM: D25.1  ICD-9-CM: 218.1  8/28/2017 - Present        Ventral hernia ICD-10-CM: K43.9  ICD-9-CM: 553.20  8/28/2017 - Present        Essential hypertension (Chronic) ICD-10-CM: I10  ICD-9-CM: 401.9  8/21/2017 - Present        Obesity (BMI 30-39. 9) ICD-10-CM: E66.9  ICD-9-CM: 278.00  8/21/2017 - Present        HAM (obstructive sleep apnea) ICD-10-CM: G47.33  ICD-9-CM: 327.23  8/21/2017 - Present        Iron deficiency anemia due to chronic blood loss ICD-10-CM: D50.0  ICD-9-CM: 280.0  7/27/2017 - Present        Fibroids (Chronic) ICD-10-CM: D25.9  ICD-9-CM: 218.9  7/9/2017 - Present        Hypertension (Chronic) ICD-10-CM: I10  ICD-9-CM: 401.9  7/9/2017 - Present        Generalized abdominal pain ICD-10-CM: R10.84  ICD-9-CM: 789.07  7/6/2017 - Present        RESOLVED: Morbid obesity ([address] (Chronic) ICD-10-CM: E66.01  ICD-9-CM: 278.01  7/9/2017 - 8/21/2017        RESOLVED: Hypokalemia ICD-10-CM: E87.6  ICD-9-CM: 276.8  7/9/2017 - 7/9/2017        RESOLVED: Symptomatic anemia ICD-10-CM: D64.9  ICD-9-CM: 285.9  7/6/2017 - 7/9/2017        RESOLVED: Bilateral leg edema ICD-10-CM: R60.0  ICD-9-CM: 782.3  7/6/2017 - 7/9/2017               Discharge Condition: Good    Hospital Course: Dulce Moreno a 40 y.o.  premenopausal female who is being seen for symptomatic uterine fibroids. Laruth Members is referred by Dr. Davison Needle was recently hospitalized for severe anemia and was transfused 2 units PRBCs.  She has a history of myomectomy in 2006.  Following that procedure the her symptoms improved for a few years, but since about 2012 they have become progressively worse.  In addition to having transfusions, she has also had multiple iron transfusions in the past. Laya Matthews has heavy periods and reports significant abdominal pain.  I have been asked to see her in consultation for further evaluation and management.        She has not had children and wishes she could, but she is very realistic about it. Laya Matthews has been told by numerous gynecologists over the last 10 years that she needs a hysterectomy. Laya Matthews has now accepted that fact. On 8/28/17 she was taken to the OR for a CHEY, BSO, left salpingooophorectomy, right salpingectomy, and repair of a ventral hernia. Operative findings:  Markedly enlarged fibroid uterus. Normal tubes and ovaries. Adhesions of rectosigmoid colon to left adnexa and posterior uterus. 3-4 cm ventral hernia several centimeters cephalad to the umbilicus. Upper abdomen within normal limits. Her post-operative course was relatively uncomplicated, aside from a mild ileus which resolved by POD #4. At that point she was ambulating without difficulty, voiding regularly, and tolerating a regular diet. At that point she was felt ready for discharge. Consults: None    Significant Diagnostic Studies: None    Disposition: home    Discharge Medications:   Cannot display discharge medications since this patient is not currently admitted. Activity: Activity as tolerated  Diet: Regular Diet  Wound Care: Keep wound clean and dry    Follow-up Appointments   Procedures    FOLLOW UP VISIT Appointment in: One Week For staple removal     For staple removal     Standing Status:   Standing     Number of Occurrences:   1     Order Specific Question:   Appointment in     Answer:    One Week       Signed By: Dina Grimes MD     September 5, 2017

## 2017-09-08 ENCOUNTER — OFFICE VISIT (OUTPATIENT)
Dept: GYNECOLOGY | Age: 37
End: 2017-09-08

## 2017-09-08 DIAGNOSIS — Z48.02 ENCOUNTER FOR REMOVAL OF SUTURES: Primary | ICD-10-CM

## 2017-09-13 ENCOUNTER — TELEPHONE (OUTPATIENT)
Dept: GYNECOLOGY | Age: 37
End: 2017-09-13

## 2017-09-13 NOTE — TELEPHONE ENCOUNTER
Pt states she had a small amount of discharge when she wiped that had old blood in it. Pt denies any foul odor or pain. Advised the pt that this can be normal after her surgery and could see this for up to 4-6 weeks. Advised to call with bright red, moderate bleeding.

## 2017-09-22 ENCOUNTER — TELEPHONE (OUTPATIENT)
Dept: GYNECOLOGY | Age: 37
End: 2017-09-22

## 2017-09-22 NOTE — TELEPHONE ENCOUNTER
Pt , she states she just received a text message reminding her of her appt on 9/26/17 but the message states appt was with Dr. Alpa Chavarria. I s/w page and it appears to be an accident. We moved her to Dr. Jenna Ramos schedule on 9/28/17 at 11:30.   Pt was informed and verbalized understanding

## 2017-09-28 ENCOUNTER — OFFICE VISIT (OUTPATIENT)
Dept: GYNECOLOGY | Age: 37
End: 2017-09-28

## 2017-09-28 VITALS
BODY MASS INDEX: 36.82 KG/M2 | HEART RATE: 66 BPM | SYSTOLIC BLOOD PRESSURE: 148 MMHG | WEIGHT: 221 LBS | HEIGHT: 65 IN | DIASTOLIC BLOOD PRESSURE: 105 MMHG

## 2017-09-28 DIAGNOSIS — K43.9 VENTRAL HERNIA WITHOUT OBSTRUCTION OR GANGRENE: ICD-10-CM

## 2017-09-28 DIAGNOSIS — D25.1 INTRAMURAL LEIOMYOMA OF UTERUS: Primary | Chronic | ICD-10-CM

## 2017-09-28 NOTE — PROGRESS NOTES
Post operative follow up. The pt is no longer taking the Hydrocodone. The pt second bp reading is 148/105., Pt is being treated for htn.

## 2017-09-28 NOTE — LETTER
NOTIFICATION OF RETURN TO WORK  
 
9/28/2017 11:11 AM 
 
Ms. Henrietta Miller [address] [place] [id number] Connie Gonzalez To Whom It May Concern: 
 
Henrietta Miller was under the care of Agnieszka Kelly. She will be able to return to work on 10/9/17 without restrictions. If there are questions or concerns please have the patient contact our office.  
 
Sincerely, 
 
 
Eros Estrada MD

## 2017-09-28 NOTE — PROGRESS NOTES
[facility] [address] [facility]  [id number] [phone]  Dr. Micheal Whitman, III    Jesse Dawn NP    Postoperative Office Note  Patient ID:  Name: Hiram Kim  MRM: [id number]  : 1980/37 y.o. Date: 2017    Diagnosis:     ICD-10-CM ICD-9-CM    1. Intramural leiomyoma of uterus D25.1 218.1    2. Ventral hernia without obstruction or gangrene K43.9 553.20        Problem List:   Patient Active Problem List   Diagnosis Code    Generalized abdominal pain R10.84    Fibroids D25.9    Hypertension I10    Iron deficiency anemia due to chronic blood loss D50.0    Essential hypertension I10    Obesity (BMI 30-39. 9) E66.9    HAM (obstructive sleep apnea) G47.33    Intramural leiomyoma of uterus D25.1    Ventral hernia K43.9       SUBJECTIVE:    Hiram Kim is a 40 y.o. female who presents for followup postoperative care following CHEY, bilateral salpingectomy, left oophorectomy, ventral hernia repair     The patient's pathology revealed: FINAL PATHOLOGIC DIAGNOSIS   Uterus, cervix, left ovary, and bilateral fallopian tubes, hysterectomy, bilateral salpingectomies, and left oophorectomy:   Cervix:   Negative for dysplasia   Endometrium:   Secretory type endometrium   Myometrium:   Leiomyomas   Bilateral fallopian tubes:   Sections of benign fallopian tubes   Left ovary:   Benign ovary with physiologic changes and leiomyoma   2. Ventral hernia sac, removal   Fibroadipose tissue consistent with hernia sac       Currently she has no problems with eating, bowel movements, or voiding. Appetite is good. Eating a regular diet without difficulty. Bowel movements are regular. The patient is not having any pain. Medications:     Current Outpatient Prescriptions on File Prior to Visit   Medication Sig Dispense Refill    lisinopril (PRINIVIL, ZESTRIL) 40 mg tablet Take 40 mg by mouth daily.       chlorthalidone (HYGROTEN) 25 mg tablet Take 1 Tab by mouth daily. 90 Tab 1    ferrous sulfate (IRON) 325 mg (65 mg iron) tablet Take 325 mg by mouth Daily (before breakfast).  HYDROcodone-acetaminophen (NORCO) 7.5-325 mg per tablet Take 1 Tab by mouth every four (4) hours as needed. Max Daily Amount: 6 Tabs. 30 Tab 0    ibuprofen (MOTRIN) 800 mg tablet Take 1 Tab by mouth every eight (8) hours as needed for Pain. 90 Tab 0     No current facility-administered medications on file prior to visit. Allergies:   No Known Allergies  Past Medical History:   Diagnosis Date    Anemia     Asthma     AS A CHILD, OUTGREW IT    Fibroids     Hypertension     Sleep apnea     HAS A C-PAP    Uterine fibroid      Past Surgical History:   Procedure Laterality Date    HX GYN  2008    MESH INSERTION BETWEEN UTERINE AND RECTUM    HX MYOMECTOMY      HX MYOMECTOMY      HX CHEY AND BSO  08/28/2017    HX TONSILLECTOMY      INSERT MESH/PELVIC FLR ADDON      MYOMECTOMY 1-4,W/TOT 250GMS/<, [address] [facility]  2006     Social History     Social History    Marital status: SINGLE     Spouse name: N/A    Number of children: N/A    Years of education: N/A     Occupational History    Not on file.      Social History Main Topics    Smoking status: Former Smoker     Quit date: 4/6/2017    Smokeless tobacco: Never Used      Comment: WAS A SOCIAL SMOKER WHEN SMOKED    Alcohol use Yes      Comment: REARLY    Drug use: Yes     Special: Marijuana      Comment: LAST TIME WAS LAST WEEK (8/14/17-8/18/17)    Sexual activity: Yes     Partners: Male      Comment: single     Other Topics Concern    Not on file     Social History Narrative       OBJECTIVE:    Vitals:   Vitals:    09/28/17 1048 09/28/17 1053   BP: (!) 151/105 (!) 148/105   Pulse: 65 66   Weight: 221 lb (100.2 kg)    Height: 5' 5\" (1.651 m)      Physical Examination:     General:  alert, cooperative, no distress   Lungs: lungs clear to auscultation   Cardiac: Regular rate and rhythm Abdomen: soft, bowel sounds active, non-tender  No CVA tenderness   Incision: healing well   Pelvic: External genitalia: normal general appearance  Urinary system: urethral meatus normal  Vaginal: normal without tenderness, induration or masses  Cervix: absent  Adnexa: non palpable  Uterus: absent   Rectal: not done   Extremity:   extremities normal, atraumatic, no cyanosis or edema     IMPRESSION:    Blanca Shen is doing well postoperatively. She has no apparent complications from surgery. Medical problems:     Patient Active Problem List   Diagnosis Code    Generalized abdominal pain R10.84    Fibroids D25.9    Hypertension I10    Iron deficiency anemia due to chronic blood loss D50.0    Essential hypertension I10    Obesity (BMI 30-39. 9) E66.9    HAM (obstructive sleep apnea) G47.33    Intramural leiomyoma of uterus D25.1    Ventral hernia K43.9       PLAN:    The operative procedures and clinical results have been reviewed with the patient. Implications of diagnosis discussed at length. All questions answered. The patient may return to work. I will see the patient back prn. The patient is advised to call our office with any problems or concerns.     Bimal Smith MD  9/28/2017/10:47 AM

## 2021-08-03 PROBLEM — I10 HYPERTENSION: Status: RESOLVED | Noted: 2017-07-09 | Resolved: 2021-08-03

## 2022-05-28 ENCOUNTER — HOSPITAL ENCOUNTER (EMERGENCY)
Age: 42
Discharge: HOME | End: 2022-05-28
Attending: STUDENT IN AN ORGANIZED HEALTH CARE EDUCATION/TRAINING PROGRAM
Payer: COMMERCIAL

## 2022-05-28 VITALS
BODY MASS INDEX: 46.65 KG/M2 | SYSTOLIC BLOOD PRESSURE: 206 MMHG | HEIGHT: 65 IN | HEART RATE: 77 BPM | TEMPERATURE: 97.9 F | DIASTOLIC BLOOD PRESSURE: 115 MMHG | RESPIRATION RATE: 18 BRPM | OXYGEN SATURATION: 98 % | WEIGHT: 280 LBS

## 2022-05-28 DIAGNOSIS — R11.2 NAUSEA AND VOMITING, UNSPECIFIED VOMITING TYPE: Primary | ICD-10-CM

## 2022-05-28 LAB
ALBUMIN SERPL-MCNC: 4.2 G/DL (ref 3.5–5)
ALBUMIN/GLOB SERPL: 0.9 {RATIO} (ref 1.1–2.2)
ALP SERPL-CCNC: 80 U/L (ref 45–117)
ALT SERPL-CCNC: 19 U/L (ref 12–78)
ANION GAP SERPL CALC-SCNC: 10 MMOL/L (ref 5–15)
APPEARANCE UR: ABNORMAL
AST SERPL-CCNC: 16 U/L (ref 15–37)
BACTERIA URNS QL MICRO: NEGATIVE /HPF
BASOPHILS # BLD: 0 K/UL (ref 0–0.1)
BASOPHILS NFR BLD: 0 % (ref 0–1)
BILIRUB SERPL-MCNC: 0.6 MG/DL (ref 0.2–1)
BILIRUB UR QL CFM: NEGATIVE
BUN SERPL-MCNC: 10 MG/DL (ref 6–20)
BUN/CREAT SERPL: 12 (ref 12–20)
CALCIUM SERPL-MCNC: 9.6 MG/DL (ref 8.5–10.1)
CHLORIDE SERPL-SCNC: 94 MMOL/L (ref 97–108)
CO2 SERPL-SCNC: 28 MMOL/L (ref 21–32)
COLOR UR: ABNORMAL
CREAT SERPL-MCNC: 0.83 MG/DL (ref 0.55–1.02)
DIFFERENTIAL METHOD BLD: ABNORMAL
EOSINOPHIL # BLD: 0 K/UL (ref 0–0.4)
EOSINOPHIL NFR BLD: 0 % (ref 0–7)
EPITH CASTS URNS QL MICRO: NORMAL /LPF
ERYTHROCYTE [DISTWIDTH] IN BLOOD BY AUTOMATED COUNT: 15 % (ref 11.5–14.5)
GLOBULIN SER CALC-MCNC: 4.9 G/DL (ref 2–4)
GLUCOSE SERPL-MCNC: 131 MG/DL (ref 65–100)
GLUCOSE UR STRIP.AUTO-MCNC: 100 MG/DL
HCT VFR BLD AUTO: 47.3 % (ref 35–47)
HGB BLD-MCNC: 15.9 G/DL (ref 11.5–16)
HGB UR QL STRIP: ABNORMAL
HYALINE CASTS URNS QL MICRO: NORMAL /LPF (ref 0–5)
IMM GRANULOCYTES # BLD AUTO: 0.1 K/UL (ref 0–0.04)
IMM GRANULOCYTES NFR BLD AUTO: 1 % (ref 0–0.5)
KETONES UR QL STRIP.AUTO: 15 MG/DL
LEUKOCYTE ESTERASE UR QL STRIP.AUTO: ABNORMAL
LIPASE SERPL-CCNC: 45 U/L (ref 73–393)
LYMPHOCYTES # BLD: 1.6 K/UL (ref 0.8–3.5)
LYMPHOCYTES NFR BLD: 12 % (ref 12–49)
MCH RBC QN AUTO: 26.4 PG (ref 26–34)
MCHC RBC AUTO-ENTMCNC: 33.6 G/DL (ref 30–36.5)
MCV RBC AUTO: 78.6 FL (ref 80–99)
MONOCYTES # BLD: 0.5 K/UL (ref 0–1)
MONOCYTES NFR BLD: 4 % (ref 5–13)
NEUTS SEG # BLD: 11.3 K/UL (ref 1.8–8)
NEUTS SEG NFR BLD: 83 % (ref 32–75)
NITRITE UR QL STRIP.AUTO: NEGATIVE
NRBC # BLD: 0 K/UL (ref 0–0.01)
NRBC BLD-RTO: 0 PER 100 WBC
PH UR STRIP: 6 [PH] (ref 5–8)
PLATELET # BLD AUTO: 251 K/UL (ref 150–400)
PMV BLD AUTO: 11.2 FL (ref 8.9–12.9)
POTASSIUM SERPL-SCNC: 3.3 MMOL/L (ref 3.5–5.1)
PROT SERPL-MCNC: 9.1 G/DL (ref 6.4–8.2)
PROT UR STRIP-MCNC: >300 MG/DL
RBC # BLD AUTO: 6.02 M/UL (ref 3.8–5.2)
RBC #/AREA URNS HPF: NORMAL /HPF (ref 0–5)
SODIUM SERPL-SCNC: 132 MMOL/L (ref 136–145)
SP GR UR REFRACTOMETRY: 1.02 (ref 1–1.03)
UROBILINOGEN UR QL STRIP.AUTO: 1 EU/DL (ref 0.2–1)
WBC # BLD AUTO: 13.5 K/UL (ref 3.6–11)
WBC URNS QL MICRO: NORMAL /HPF (ref 0–4)

## 2022-05-28 PROCEDURE — 74011000250 HC RX REV CODE- 250: Performed by: STUDENT IN AN ORGANIZED HEALTH CARE EDUCATION/TRAINING PROGRAM

## 2022-05-28 PROCEDURE — 99284 EMERGENCY DEPT VISIT MOD MDM: CPT

## 2022-05-28 PROCEDURE — 74011250636 HC RX REV CODE- 250/636: Performed by: STUDENT IN AN ORGANIZED HEALTH CARE EDUCATION/TRAINING PROGRAM

## 2022-05-28 PROCEDURE — 80053 COMPREHEN METABOLIC PANEL: CPT

## 2022-05-28 PROCEDURE — 83690 ASSAY OF LIPASE: CPT

## 2022-05-28 PROCEDURE — 96361 HYDRATE IV INFUSION ADD-ON: CPT

## 2022-05-28 PROCEDURE — 85025 COMPLETE CBC W/AUTO DIFF WBC: CPT

## 2022-05-28 PROCEDURE — 96374 THER/PROPH/DIAG INJ IV PUSH: CPT

## 2022-05-28 PROCEDURE — 36415 COLL VENOUS BLD VENIPUNCTURE: CPT

## 2022-05-28 PROCEDURE — 96375 TX/PRO/DX INJ NEW DRUG ADDON: CPT

## 2022-05-28 PROCEDURE — 81001 URINALYSIS AUTO W/SCOPE: CPT

## 2022-05-28 RX ORDER — NAPROXEN 500 MG/1
500 TABLET ORAL 2 TIMES DAILY WITH MEALS
Qty: 20 TABLET | Refills: 0 | Status: SHIPPED | OUTPATIENT
Start: 2022-05-28 | End: 2022-06-07

## 2022-05-28 RX ORDER — ONDANSETRON 2 MG/ML
4 INJECTION INTRAMUSCULAR; INTRAVENOUS
Status: COMPLETED | OUTPATIENT
Start: 2022-05-28 | End: 2022-05-28

## 2022-05-28 RX ORDER — ONDANSETRON 4 MG/1
4 TABLET, FILM COATED ORAL
Qty: 10 TABLET | Refills: 0 | Status: SHIPPED | OUTPATIENT
Start: 2022-05-28

## 2022-05-28 RX ORDER — FAMOTIDINE 20 MG/1
20 TABLET, FILM COATED ORAL
Qty: 20 TABLET | Refills: 0 | Status: SHIPPED | OUTPATIENT
Start: 2022-05-28 | End: 2022-06-17

## 2022-05-28 RX ADMIN — ONDANSETRON 4 MG: 2 INJECTION INTRAMUSCULAR; INTRAVENOUS at 08:03

## 2022-05-28 RX ADMIN — SODIUM CHLORIDE 1000 ML: 9 INJECTION, SOLUTION INTRAVENOUS at 08:03

## 2022-05-28 RX ADMIN — FAMOTIDINE 20 MG: 10 INJECTION, SOLUTION INTRAVENOUS at 08:03

## 2022-05-28 NOTE — ED NOTES
Patient here with c/o nausea and vomiting. Patient states symptoms started early this morning when she first woke up. Patient states she felt a little nausea last night however symptoms worsen this AM.  Patient denies fevers, denies contact with anyone with known illness. Patient denies concern for pregnancy. Patient reports a headache after she started vomiting, states that she has been out of her blood pressure medication for over a month. Patient denies diarrhea or constipation, states last bowel movement yesterday which she reports as normal.              Emergency Department Nursing Plan of Care       The Nursing Plan of Care is developed from the Nursing assessment and Emergency Department Attending provider initial evaluation. The plan of care may be reviewed in the ED Provider note.     The Plan of Care was developed with the following considerations:   Patient / Family readiness to learn indicated by:verbalized understanding  Persons(s) to be included in education: patient  Barriers to Learning/Limitations:No    Signed     Dominic Saucedo RN    5/28/2022   7:50 AM

## 2022-05-28 NOTE — ED NOTES
Patient given ginger ale for PO challenge. Patient resting with IV fluids continued, states not ready to take a sip of drink yet. Patient not feeling need to urinate yet.

## 2022-05-28 NOTE — ED PROVIDER NOTES
EMERGENCY DEPARTMENT HISTORY AND PHYSICAL EXAM      Date: 5/28/2022  Patient Name: Shaheed Murphy    History of Presenting Illness     Chief Complaint   Patient presents with    Vomiting         HPI: Shaheed Murphy, 40 y.o. female presents to the ED with cc of vomiting. This has been going for the past day. She reports countless episodes of nonbloody emesis, and about 4 episodes of nonbloody diarrhea. She reports some associated epigastric and left upper quadrant crampy pain. Denies fevers, denies dysuria or hematuria, no known sick contacts or exposures. She has a history of hypertension, however has not been taking her blood pressure medication for the past month. There are no other complaints, changes, or physical findings at this time. PCP: Silvia Parmar MD (Inactive)    No current facility-administered medications on file prior to encounter. Current Outpatient Medications on File Prior to Encounter   Medication Sig Dispense Refill    lisinopril (PRINIVIL, ZESTRIL) 40 mg tablet Take 40 mg by mouth daily.  HYDROcodone-acetaminophen (NORCO) 7.5-325 mg per tablet Take 1 Tab by mouth every four (4) hours as needed. Max Daily Amount: 6 Tabs. (Patient not taking: Reported on 5/28/2022) 30 Tab 0    ibuprofen (MOTRIN) 800 mg tablet Take 1 Tab by mouth every eight (8) hours as needed for Pain. (Patient not taking: Reported on 5/28/2022) 90 Tab 0    chlorthalidone (HYGROTEN) 25 mg tablet Take 1 Tab by mouth daily. 90 Tab 1    ferrous sulfate (IRON) 325 mg (65 mg iron) tablet Take 325 mg by mouth Daily (before breakfast).  (Patient not taking: Reported on 5/28/2022)         Past History     Past Medical History:  Past Medical History:   Diagnosis Date    Anemia     Asthma     AS A CHILD, OUTGREW IT    Fibroids     Hypertension     Sleep apnea     HAS A C-PAP    Uterine fibroid        Past Surgical History:  Past Surgical History:   Procedure Laterality Date    HX GYN  2008 MESH INSERTION BETWEEN UTERINE AND RECTUM    HX MYOMECTOMY      HX MYOMECTOMY      HX CHEY AND BSO  2017    HX TONSILLECTOMY      [place] INSERT MESH/PELVIC FLR ADDON      [place] MYOMECTOMY 1-4,W/TOT 250GMS/<, [facility] [place]         Family History:  Family History   Problem Relation Age of Onset   Wilhelminia Blazing Cancer Mother         lung    Hypertension Mother     Hypertension Father     Diabetes Father     Heart Disease Father     Stroke Father     Cancer Maternal Grandmother         lung    No Known Problems Sister     Diabetes Brother     No Known Problems Brother     No Known Problems Brother     Anesth Problems Neg Hx        Social History:  Social History     Tobacco Use    Smoking status: Former Smoker     Quit date: 2017     Years since quittin.1    Smokeless tobacco: Never Used    Tobacco comment: WAS A SOCIAL SMOKER WHEN SMOKED   Substance Use Topics    Alcohol use: Yes     Comment: REARLY    Drug use: Yes     Types: Marijuana     Comment: LAST TIME WAS LAST WEEK (17-17)       Allergies:  No Known Allergies      Review of Systems   no fever  No ear pain  No eye pain  no shortness of breath  no chest pain  Reports crampy epigastric abdominal pain  no dysuria  no leg pain  No rash  No lymphadenopathy  No weight loss    Physical Exam   Physical Exam  Constitutional:       Appearance: She is not toxic-appearing. Comments: Emesis bag in hand, intermittent retching   HENT:      Head: Normocephalic. Eyes:      Extraocular Movements: Extraocular movements intact. Cardiovascular:      Rate and Rhythm: Normal rate and regular rhythm. Pulmonary:      Effort: Pulmonary effort is normal.      Breath sounds: Normal breath sounds. Abdominal:      Palpations: Abdomen is soft. Tenderness: There is no abdominal tenderness. Musculoskeletal:         General: No tenderness or deformity. Cervical back: Neck supple. Skin:     General: Skin is warm and dry.    Neurological: General: No focal deficit present. Mental Status: She is alert. Psychiatric:         Mood and Affect: Mood normal.         Diagnostic Study Results     Labs -     Recent Results (from the past 24 hour(s))   CBC WITH AUTOMATED DIFF    Collection Time: 05/28/22  8:02 AM   Result Value Ref Range    WBC 13.5 (H) 3.6 - 11.0 K/uL    RBC 6.02 (H) 3.80 - 5.20 M/uL    HGB 15.9 11.5 - 16.0 g/dL    HCT 47.3 (H) 35.0 - 47.0 %    MCV 78.6 (L) 80.0 - 99.0 FL    MCH 26.4 26.0 - 34.0 PG    MCHC 33.6 30.0 - 36.5 g/dL    RDW 15.0 (H) 11.5 - 14.5 %    PLATELET 940 200 - 245 K/uL    MPV 11.2 8.9 - 12.9 FL    NRBC 0.0 0  WBC    ABSOLUTE NRBC 0.00 0.00 - 0.01 K/uL    NEUTROPHILS 83 (H) 32 - 75 %    LYMPHOCYTES 12 12 - 49 %    MONOCYTES 4 (L) 5 - 13 %    EOSINOPHILS 0 0 - 7 %    BASOPHILS 0 0 - 1 %    IMMATURE GRANULOCYTES 1 (H) 0.0 - 0.5 %    ABS. NEUTROPHILS 11.3 (H) 1.8 - 8.0 K/UL    ABS. LYMPHOCYTES 1.6 0.8 - 3.5 K/UL    ABS. MONOCYTES 0.5 0.0 - 1.0 K/UL    ABS. EOSINOPHILS 0.0 0.0 - 0.4 K/UL    ABS. BASOPHILS 0.0 0.0 - 0.1 K/UL    ABS. IMM. GRANS. 0.1 (H) 0.00 - 0.04 K/UL    DF AUTOMATED         Radiologic Studies -   No orders to display     CT Results  (Last 48 hours)    None        CXR Results  (Last 48 hours)    None            Medical Decision Making   I am the first provider for this patient. I reviewed the vital signs, available nursing notes, past medical history, past surgical history, family history and social history. Vital Signs-Reviewed the patient's vital signs. Patient Vitals for the past 24 hrs:   Temp Pulse Resp BP SpO2   05/28/22 0730 97.9 °F (36.6 °C) 77 18 (!) 206/115 98 %         Provider Notes (Medical Decision Making):   27-year-old female presenting with nausea vomiting and diarrhea. Differential includes gastroenteritis, gastritis, GERD, viral syndrome, will assess for electrolyte or metabolic abnormalities or UTI.   Blood pressure is elevated, however she has known history of hypertension and has been noncompliant with medications likely explaining her hypertension here today. Will assess renal function, otherwise no other evidence of endorgan dysfunction secondary to elevated blood pressure acutely. She will be given IV fluids and antiemetics. Her abdominal exam is benign and nontender, she is afebrile nontoxic-appearing, unlikely any other acute intra-abdominal infection or obstruction. ED Course:     Initial assessment performed. The patients presenting problems have been discussed, and they are in agreement with the care plan formulated and outlined with them. I have encouraged them to ask questions as they arise throughout their visit. Basic metabolic panel with normal renal function with creatinine of 0.838, CBC negative for anemia. On reevaluation, patient is resting comfortably and states that they feel improved. She is able to tolerate p.o. Patient is counseled on supportive care and return precautions. Will return to the ED for any worsening intractable vomiting, fevers, pain, or any new or worrisome symptoms. Will followup with primary care doctor within 3 days. Critical Care Time:         Disposition:  Home    PLAN:  1. Current Discharge Medication List        2.    Follow-up Information    None       Return to ED if worse     Diagnosis     Clinical Impression: Acute nausea and vomiting, elevated blood pressure with history of hypertension

## 2022-05-28 NOTE — LETTER
[facility] EMERGENCY DEPT  [address] [facility] [address] [phone]    Work/School Note    Date: 5/28/2022    To Whom It May concern:    Samantha Bernal was seen and treated today in the emergency room by the following provider(s):  Attending Provider: Bartolo Hilliard MD.      Samantha Bernal may return to work on 5/31/2022.     Sincerely,                  Deepa MIGUEL

## (undated) DEVICE — DRSG AQUACEL SURG 3.5 X 10IN -- CONVERT TO ITEM 370183

## (undated) DEVICE — TRAY PREP DRY W/ PREM GLV 2 APPL 6 SPNG 2 UNDPD 1 OVERWRAP

## (undated) DEVICE — SURGICAL PROCEDURE PACK GYN ONCOLOGY CUST ST MARYS LF

## (undated) DEVICE — SURGICAL PROCEDURE PACK BASIN MAJ SET CUST NO CAUT

## (undated) DEVICE — BLADE ELECTRODE: Brand: EDGE

## (undated) DEVICE — CATH FOL TY IC BAG 16FR 2000ML -- CONVERT TO ITEM 363158

## (undated) DEVICE — SOLUTION IV 1000ML 0.9% SOD CHL

## (undated) DEVICE — MEDI-VAC YANK SUCT HNDL W/TPRD BULBOUS TIP: Brand: CARDINAL HEALTH

## (undated) DEVICE — KENDALL SCD EXPRESS SLEEVES, KNEE LENGTH, MEDIUM: Brand: KENDALL SCD

## (undated) DEVICE — SOLUTION IRRIG 1000ML H2O STRL BLT

## (undated) DEVICE — INFECTION CONTROL KIT SYS

## (undated) DEVICE — SLIM BODY SKIN STAPLER: Brand: APPOSE ULC

## (undated) DEVICE — ROCKER SWITCH PENCIL BLADE ELECTRODE, HOLSTER: Brand: EDGE

## (undated) DEVICE — LIGHT HANDLE: Brand: DEVON

## (undated) DEVICE — SPONGE LAP 18X18IN STRL -- 5/PK

## (undated) DEVICE — (D)PREP SKN CHLRAPRP APPL 26ML -- CONVERT TO ITEM 371833

## (undated) DEVICE — DEVON™ KNEE AND BODY STRAP 60" X 3" (1.5 M X 7.6 CM): Brand: DEVON

## (undated) DEVICE — PAD SANIT NPKN 4IN GRD

## (undated) DEVICE — REM POLYHESIVE ADULT PATIENT RETURN ELECTRODE: Brand: VALLEYLAB

## (undated) DEVICE — SUTURE PDS II SZ 1 L96IN ABSRB VLT TP-1 L65MM 1/2 CIR Z880G

## (undated) DEVICE — GLOVE SURG SZ 75 L1212IN FNGR THK138MIL BRN LTX FREE

## (undated) DEVICE — SEALER TISS L20CM DIA13MM ADV BPLR L CRV JAW OPN APPRCH

## (undated) DEVICE — SUTURE VCRL SZ 0 L36IN ABSRB VLT L36MM CT-1 1/2 CIR J346H

## (undated) DEVICE — 3000CC GUARDIAN II: Brand: GUARDIAN

## (undated) DEVICE — TOWEL SURG W17XL27IN STD BLU COT NONFENESTRATED PREWASHED

## (undated) DEVICE — SUTURE VCRL SZ 2-0 L36IN ABSRB UD L36MM CT-1 1/2 CIR J945H